# Patient Record
Sex: FEMALE | Race: WHITE | Employment: FULL TIME | URBAN - METROPOLITAN AREA
[De-identification: names, ages, dates, MRNs, and addresses within clinical notes are randomized per-mention and may not be internally consistent; named-entity substitution may affect disease eponyms.]

---

## 2018-01-08 ENCOUNTER — NAIL CHANGES (OUTPATIENT)
Dept: URBAN - METROPOLITAN AREA CLINIC 32 | Facility: CLINIC | Age: 38
Setting detail: DERMATOLOGY
End: 2018-01-08

## 2018-01-08 PROBLEM — L60.9 NAIL DISORDER, UNSPECIFIED: Status: RESOLVED | Noted: 2018-01-08

## 2018-01-08 PROBLEM — D22.5 MELANOCYTIC NEVI OF TRUNK: Status: RESOLVED | Noted: 2018-01-08

## 2018-01-08 PROCEDURE — 99202 OFFICE O/P NEW SF 15 MIN: CPT

## 2018-01-08 RX ORDER — FLUOCINONIDE 0.5 MG/ML
1 APPLICATION SOLUTION TOPICAL ADD'L SIG
Qty: 20 | Refills: 1
Start: 2018-01-08

## 2018-01-08 RX ORDER — KETOCONAZOLE 20 MG/ML
1 APPLICATION SHAMPOO, SUSPENSION TOPICAL 2 TIMES WEEKLY
Qty: 120 | Refills: 1
Start: 2018-01-08

## 2019-07-29 PROBLEM — 196735001 CSG - CHRONIC SUPERFICIAL GASTRITIS: Status: ACTIVE | Noted: 2019-07-29

## 2019-09-25 PROBLEM — 235856003 LIVER DISEASE: Status: ACTIVE | Noted: 2019-09-24

## 2020-01-25 PROBLEM — 398050005 DIVERTICULAR DISEASE OF COLON: Status: ACTIVE | Noted: 2019-11-18

## 2020-01-25 PROBLEM — 721703004 FIRST DEGREE HEMORRHOIDS: Status: ACTIVE | Noted: 2019-11-19

## 2020-07-20 ENCOUNTER — WEB ENCOUNTER (OUTPATIENT)
Dept: URBAN - METROPOLITAN AREA CLINIC 35 | Facility: CLINIC | Age: 40
End: 2020-07-20

## 2020-07-21 ENCOUNTER — OFFICE VISIT (OUTPATIENT)
Dept: URBAN - METROPOLITAN AREA CLINIC 35 | Facility: CLINIC | Age: 40
End: 2020-07-21

## 2020-07-21 ENCOUNTER — OFFICE VISIT (OUTPATIENT)
Dept: URBAN - METROPOLITAN AREA CLINIC 31 | Facility: CLINIC | Age: 40
End: 2020-07-21

## 2020-07-21 VITALS — BODY MASS INDEX: 24.11 KG/M2 | HEIGHT: 66 IN | WEIGHT: 150 LBS

## 2020-07-21 PROBLEM — 301717006 RIGHT UPPER QUADRANT PAIN: Status: ACTIVE | Noted: 2019-09-25

## 2020-07-21 PROBLEM — 271832001 FLATULENCE, ERUCTATION AND GAS PAIN: Status: ACTIVE | Noted: 2019-09-25

## 2020-07-21 PROBLEM — 80515008: Status: ACTIVE | Noted: 2019-10-01

## 2020-07-21 PROBLEM — 249504006 FLATULENCE: Status: ACTIVE | Noted: 2019-09-25

## 2020-07-21 PROBLEM — 441988000 IMAGING OF ABDOMEN ABNORMAL: Status: ACTIVE | Noted: 2019-11-19

## 2020-07-21 PROBLEM — 79922009 EPIGASTRIC PAIN: Status: ACTIVE | Noted: 2019-09-25

## 2020-07-21 RX ORDER — FERROUS SULFATE 325(65) MG
1 TABLET TABLET ORAL ONCE A DAY
Status: ACTIVE | COMMUNITY

## 2020-07-21 RX ORDER — BUSPIRONE HYDROCHLORIDE 7.5 MG/1
TAKE 1 TABLET BY MOUTH THREE TIMES A DAY AS NEEDED TABLET ORAL
Qty: 90 UNSPECIFIED | Refills: 0 | Status: ACTIVE | COMMUNITY

## 2020-07-21 NOTE — HPI-MIGRATED HPI
;   ;   ;   ;   ;   ;     Bloating/Gas : Continue to admit bloating and gas throughout the day.  Gas-X help with bloating.   Consuming potatoes and raw vegetables has been an aggravating factor, which she has decreased intake.   Last visit (1/21/2020) Patient denies less frequent episodes of bloating and gas since her last office visit. She reports that she will experience bloating daily. She describes symptoms as pressure. She admits that the abdominal pressure decreases after a bowel movement but the bloating remains.   She reports the bloating increases after eating or drinking. She denies that it depends on what she eats or drinks.    Last visit (9/24/2019) Admit bloating and gas with onset in Feb. 2019.  Described as generalized abdomina distention and increased flatulence.  She has tried Gas-X with relief.  H. Pylori (6/212019) with PCP- Negative (reported by patient)  ;   Abnormal MRI :    Last visit (1/21/2020) Patient denies any other MRIs at this time.    Last visit (9/24/2019) Patient presents today at the request of Dr. Page for a consultation of abnormal MRI Abdomen w/wo contrast performed on (8/24/2019) at [facility] revealing--There is elongation of the right hepatic lobe which is likely on the basis of a Riedel's configuration, a normal variation.  No suspicious hepatic mass.  No significant helatic steatosis.  No splenomegaly.  No focal splenic lesion.  No evidence for acute cholecystitis or acute pancreatitis.  Main portal vein at the upper limits of normal in diameter, nonspecific.    Also had abnormal CT Abdomen/Pelvis w/Contrast performed on (05/31/2019) and revealed Mild hepatomegaly measuring 20 cm without other morphologic changes to suggest liver disease or hepatic masses. 2. The spleen is not enlarged however there is moderate severity splenic varices and prominence to the portal vein. Further clinical follow up is recommended.  3. Dominant benign - appearing cyst is seen within the left adnexa measuring 3.9 cm. Dedicated endovaginal pelvic ultrasound would be of benefit for further characterization. 4. Small volume pelvic fluid .    She report having Hepatic Function Panel, Hepatitis Panel, TSH, Vitamin B, and Autoimmune panel performed by PCP on (9/20/2019) and awaiting results.   She has been referred to vascular surgeon (Dr. Tony Torres) following GI work up. ;   Dysphagia : Admit dysphagia with onset in Jan. 2020 at which time she saw her dentist and was referred to oral surgeon for sensation of right side of her tongue felt swelling and not able to move her tongue as she used to and was recommended Speech therapy.   She has completed 30 sessions of physical therapy with [facility] at [facility] in [place].  Patient report initially had 100 % improvement, now at 75% due to continuous exercises she has to do daily, but has not been consistent.;   Heartburn : Admit control of symptoms with dietary modifications, avoiding tomatoes products.   Last visit (1/21/2020)  Admit control of symptoms with dietary modifications, not eating late night and sleeping head elevated. She admits that these adjustments have helped her symptoms.   She denies taking any medications she reports that they gave her headaches.    Of last visit (9/24/2019) Admit heartburn symptoms with onset Feb. 2019.  Described as burning sensation retrosternally.  She report symptoms only occur when consuming acidic fruit/food and tomato based food or having and empty stomach.  She has been sleeping head elevated with some improvement. Also admit improvement since her anemia has been more under control.    ;   Abdominal Pain : Patient presents today via televisit with consent for a follow up of pain located in the epigastrium and RUQ.  Admit less frequent episodes since last visit, maybe 3 times a month,    Last visit (1/21/2020)  Continue to experience abdominal pain located in the epigastrium and RUQ intermittently throughout the day. Abdominal pressure , intermittent , worse with bread and butter, stress    Last visit (11/19/2019) Patient admits/denies any abdominal pain since her last office visit. Admits a swelling at the IV site which was placed for her colonoscopy. She reports the swelling has improved with warm compresses.   Currently admit 1 normal and formed bowel movement per day.  Denies melena, blood or mucus in stools.    Last visit (9/24/2019) Patient presents today for a follow up of a colonoscopy that was completed on 11/1/2019. Colonoscopy revealed Diverticulosis and first degree hemorrhoids.   Last visit (9/24/2019)  Continue to experiencing abdominal pain located in the epigastrium and RUQ.  Onset (2/2019). Described as a constant pain and pressure in the epigastrium.   RUQ pain described as a "pulling" sensation that will last for minutes at a time intermittently throughout the day.    Admit aggravating factor with bad posture while sitting. Laying down has been a relieving factor.   She has not taken medication for symptoms.    She admit treatment from PCP in May 2019 with Pepcid with some relief in the epigastrium, but d/c due to severe headaches.    Last visit (7/29/2019) Patient continue to experience some abdominal discomfort from her last office visit. Patient did not start taking Omeprazole 20 mg daily. She did not know that she could get the medication OTC so she did not start it.    On last visit 06/21/2019, Patient admits symptoms of abdominal pain that started in January/February 2019. The pain is located epigastric and periumbilical region. She describes the pain as a constant discomfort and tenderness. Eating will trigger the discomfort. Laying down will help to alleviate discomfort.    Associated symptoms include early satiety. Patient states that she will feel fast when she eats. She denies any nausea/vomiting but she will experience some dry heaving.   Patient was taking Pepcid which did slightly help with relief of indigestion but she stopped taking it because she was advised not to take Pepcid with iron. She states that she started taking iron 05/29/2019 and has noticed slightly improvements with her symptoms since starting.  Patient had a CT abd/pelvis done with PCP (results below).  Patient states that since she started taking the iron supplements her bowel movements are more regular, she will have 2 BM per day. She has never noticed any blood, mucus, melena in stools. ;   Anemia : Most recent labs with [facility] June 12, 2020 CBC: Hgb (WNL) 13.3, Hct (WNL) 43.1, MCV (WNL) 92, MCHC (Low) 30.9, Platelets (WNL) 284.    She  continue to take Ferrous sulfate 65 fe QD.     Last visit (1/21/2020) She continue to take Iron supplement 1 QD.  Admits a recent CBC or Iron/Ferritin lab work 12/31/2019.    Of last visit (9/24/2019) Most recent labs (9/20/2019) with PCP: Hepatic Function Panel, Iron,  Hepatitis Panel, TSH, Vitamin B, Autoimmune panel performed by PCP on (9/20/2019) and awaiting results.     Last visit (7/29/2019) Patient continues to take iron supplements since May 4 2019  On last visit 06/21/2019, Patient was recently diagnosed by CESILIA Tabor at [facility], and the most recent labs were taken 06/05/2019. Patient currently states that she started taking an iron supplements 05/29/2019 and that she has not had a transfusion. Patient did experience fatigue and light headedness, but after taking the iron she has noticed improvements with the fatigue. Patient admits NSAID use, a history of GI bleeding, any blood or melena in stools, fatigue, cold extremities, dizziness.  Patient states she has not had an EGD before.    CT ABD/PELVIS 1. Mild hepatomegaly measuring 20 cm without other morphologic changes to suggest liver disease o hepatic masses. 2. The spleen is not enlarged however there is moderate severity splenic varices and prominence to the portal vein. Further clinical follow-is recommended. 3. Dominant benign-appeared cyst is seen within the elft adnexa measuring 3.9 cm. Dedicated endovaginal pelvis US would be of benefit for further characterization. 4. Small volume pelvic fluid.   OUTSIDE LABS 06/05/2019 - Dr. Tabor Fecal globin by immunochemistry - negative CMP normal CBC normal except Hemoglobin 9.3 L Hematocrit 32.3 L MCV 76.0 L MCH 21.9 L MCHC 28.8 L RDW 16.8 H Ferritin 5 L Iron, total 16 L % Sat 4 L OUTSIDE LABS 05/28/2019 CMP normal CBC normal except Hemoglobin 9.2 L Hematocrit 32.4 L MCV 77.9 L MCH 22.1 L MCHC 28.4 L RDW 17.0 H Helicobacter pylori - negative;

## 2020-07-26 ENCOUNTER — TELEPHONE ENCOUNTER (OUTPATIENT)
Dept: URBAN - METROPOLITAN AREA CLINIC 35 | Facility: CLINIC | Age: 40
End: 2020-07-26

## 2020-07-30 ENCOUNTER — TELEPHONE ENCOUNTER (OUTPATIENT)
Dept: URBAN - METROPOLITAN AREA CLINIC 35 | Facility: CLINIC | Age: 40
End: 2020-07-30

## 2020-08-07 ENCOUNTER — WEB ENCOUNTER (OUTPATIENT)
Dept: URBAN - METROPOLITAN AREA CLINIC 35 | Facility: CLINIC | Age: 40
End: 2020-08-07

## 2020-08-10 ENCOUNTER — TELEPHONE ENCOUNTER (OUTPATIENT)
Dept: URBAN - METROPOLITAN AREA CLINIC 35 | Facility: CLINIC | Age: 40
End: 2020-08-10

## 2020-08-10 ENCOUNTER — WEB ENCOUNTER (OUTPATIENT)
Dept: URBAN - METROPOLITAN AREA CLINIC 35 | Facility: CLINIC | Age: 40
End: 2020-08-10

## 2020-08-12 ENCOUNTER — TELEPHONE ENCOUNTER (OUTPATIENT)
Dept: URBAN - METROPOLITAN AREA CLINIC 35 | Facility: CLINIC | Age: 40
End: 2020-08-12

## 2020-08-20 ENCOUNTER — OFFICE VISIT (OUTPATIENT)
Dept: URBAN - METROPOLITAN AREA CLINIC 33 | Facility: CLINIC | Age: 40
End: 2020-08-20

## 2020-08-20 VITALS
SYSTOLIC BLOOD PRESSURE: 118 MMHG | TEMPERATURE: 97.4 F | HEART RATE: 78 BPM | HEIGHT: 66 IN | WEIGHT: 156 LBS | DIASTOLIC BLOOD PRESSURE: 72 MMHG | BODY MASS INDEX: 25.07 KG/M2

## 2020-08-20 RX ORDER — BUSPIRONE HYDROCHLORIDE 7.5 MG/1
TAKE 1 TABLET BY MOUTH THREE TIMES A DAY AS NEEDED TABLET ORAL
Qty: 90 UNSPECIFIED | Refills: 0 | Status: ACTIVE | COMMUNITY

## 2020-08-20 RX ORDER — FERROUS SULFATE 325(65) MG
1 TABLET TABLET ORAL ONCE A DAY
Status: ACTIVE | COMMUNITY

## 2020-08-20 NOTE — HPI-MIGRATED HPI
;   ;   ;   ;   ;   ;     Bloating/Gas : Admit generalized abdominal bloating and increase gas associated with upper abdominal discomfort.  Symptoms occur when she consume fried greasy food.  Occurring once a week since last visit.   She will take Gas-X with severe bloating and gas episodes with relief.     Last visit (7/21/2020)Continue to admit bloating and gas throughout the day. Gas-X help with bloating. Consuming potatoes and raw vegetables has been an aggravating factor, which she has decreased intake. Last visit (1/21/2020) Patient denies less frequent episodes of bloating and gas since her last office visit. She reports that she will experience bloating daily. She describes symptoms as pressure. She admits that the abdominal pressure decreases after a bowel movement but the bloating remains. She reports the bloating increases after eating or drinking. She denies that it depends on what she eats or drinks. Last visit (9/24/2019) Admit bloating and gas with onset in Feb. 2019. Described as generalized abdomina distention and increased flatulence. She has tried Gas-X with relief. H. Pylori (6/212019) with PCP- Negative (reported by patient) ;   Abnormal MRI : Patient did not have MRI due to insurance denial (working on resubmitting).    Last visit (7/21/2020) Patient denies any other MRIs at this time.    Last visit (9/24/2019) Patient presents today at the request of Dr. Page for a consultation of abnormal MRI Abdomen w/wo contrast performed on (8/24/2019) at [facility] revealing--There is elongation of the right hepatic lobe which is likely on the basis of a Riedel's configuration, a normal variation.  No suspicious hepatic mass.  No significant helatic steatosis.  No splenomegaly.  No focal splenic lesion.  No evidence for acute cholecystitis or acute pancreatitis.  Main portal vein at the upper limits of normal in diameter, nonspecific.    Also had abnormal CT Abdomen/Pelvis w/Contrast performed on (05/31/2019) and revealed Mild hepatomegaly measuring 20 cm without other morphologic changes to suggest liver disease or hepatic masses. 2. The spleen is not enlarged however there is moderate severity splenic varices and prominence to the portal vein. Further clinical follow up is recommended.  3. Dominant benign - appearing cyst is seen within the left adnexa measuring 3.9 cm. Dedicated endovaginal pelvic ultrasound would be of benefit for further characterization. 4. Small volume pelvic fluid .    She report having Hepatic Function Panel, Hepatitis Panel, TSH, Vitamin B, and Autoimmune panel performed by PCP on (9/20/2019) and awaiting results.   She has been referred to vascular surgeon (Dr. Collin Torres) following GI work up. ;   Dysphagia : Denies dysphagia.   Last visit (7/21/2020) Admit dysphagia with onset in Jan. 2020 at which time she saw her dentist and was referred to oral surgeon for sensation of right side of her tongue felt swelling and not able to move her tongue as she used to and was recommended Speech therapy.   She has completed 30 sessions of physical therapy with [facility] at [facility] in [place].  Patient report initially had 100 % improvement, now at 75% due to continuous exercises she has to do daily, but has not been consistent.;   Heartburn : Admit control of symptoms with dietary modifications, avoiding tomatoes products.   Last visit (7/21/2020)  Admit control of symptoms with dietary modifications, not eating late night and sleeping head elevated. She admits that these adjustments have helped her symptoms.   She denies taking any medications she reports that they gave her headaches.    Of last visit (9/24/2019) Admit heartburn symptoms with onset Feb. 2019.  Described as burning sensation retrosternally.  She report symptoms only occur when consuming acidic fruit/food and tomato based food or having and empty stomach.  She has been sleeping head elevated with some improvement. Also admit improvement since her anemia has been more under control.    ;   Abdominal Pain : Patient presents today for a follow up of pain located in the epigastrium and RUQ.  Described as a discomfort that will last throughout the day with associated bloating and increase gas.  Symptoms occur when she consume fried greasy food.  Occurring once a week since last visit.   She will take Gas-X with severe bloating and gas episodes with relief. Also stretching will help the abdominal discomfort.    Last visit (7/21/2020)  Continue to experience abdominal pain located in the epigastrium and RUQ intermittently throughout the day. Abdominal pressure , intermittent , worse with bread and butter, stress    Last visit (11/19/2019) Patient admits/denies any abdominal pain since her last office visit. Admits a swelling at the IV site which was placed for her colonoscopy. She reports the swelling has improved with warm compresses.   Currently admit 1 normal and formed bowel movement per day.  Denies melena, blood or mucus in stools.    Last visit (9/24/2019) Patient presents today for a follow up of a colonoscopy that was completed on 11/1/2019. Colonoscopy revealed Diverticulosis and first degree hemorrhoids.   Last visit (9/24/2019)  Continue to experiencing abdominal pain located in the epigastrium and RUQ.  Onset (2/2019). Described as a constant pain and pressure in the epigastrium.   RUQ pain described as a "pulling" sensation that will last for minutes at a time intermittently throughout the day.    Admit aggravating factor with bad posture while sitting. Laying down has been a relieving factor.   She has not taken medication for symptoms.    She admit treatment from PCP in May 2019 with Pepcid with some relief in the epigastrium, but d/c due to severe headaches.    Last visit (7/29/2019) Patient continue to experience some abdominal discomfort from her last office visit. Patient did not start taking Omeprazole 20 mg daily. She did not know that she could get the medication OTC so she did not start it.    On last visit 06/21/2019, Patient admits symptoms of abdominal pain that started in January/February 2019. The pain is located epigastric and periumbilical region. She describes the pain as a constant discomfort and tenderness. Eating will trigger the discomfort. Laying down will help to alleviate discomfort.    Associated symptoms include early satiety. Patient states that she will feel fast when she eats. She denies any nausea/vomiting but she will experience some dry heaving.   Patient was taking Pepcid which did slightly help with relief of indigestion but she stopped taking it because she was advised not to take Pepcid with iron. She states that she started taking iron 05/29/2019 and has noticed slightly improvements with her symptoms since starting.  Patient had a CT abd/pelvis done with PCP (results below).  Patient states that since she started taking the iron supplements her bowel movements are more regular, she will have 2 BM per day. She has never noticed any blood, mucus, melena in stools. ;   Anemia : Most recent labs with [facility]BG[facility] June 12, 2020 CBC: Hgb (WNL) 13.3, Hct (WNL) 43.1, MCV (WNL) 92, MCHC (Low) 30.9, Platelets (WNL) 284.    She  continue to take Ferrous sulfate 65 fe QD.     Last visit (7/21/2020) She continue to take Iron supplement 1 QD.  Admits a recent CBC or Iron/Ferritin lab work 12/31/2019.    Of last visit (9/24/2019) Most recent labs (9/20/2019) with PCP: Hepatic Function Panel, Iron,  Hepatitis Panel, TSH, Vitamin B, Autoimmune panel performed by PCP on (9/20/2019) and awaiting results.     Last visit (7/29/2019) Patient continues to take iron supplements since May 4 2019  On last visit 06/21/2019, Patient was recently diagnosed by CESILIA Tabor at [facility], and the most recent labs were taken 06/05/2019. Patient currently states that she started taking an iron supplements 05/29/2019 and that she has not had a transfusion. Patient did experience fatigue and light headedness, but after taking the iron she has noticed improvements with the fatigue. Patient admits NSAID use, a history of GI bleeding, any blood or melena in stools, fatigue, cold extremities, dizziness.  Patient states she has not had an EGD before.    CT ABD/PELVIS 1. Mild hepatomegaly measuring 20 cm without other morphologic changes to suggest liver disease o hepatic masses. 2. The spleen is not enlarged however there is moderate severity splenic varices and prominence to the portal vein. Further clinical follow-is recommended. 3. Dominant benign-appeared cyst is seen within the elft adnexa measuring 3.9 cm. Dedicated endovaginal pelvis US would be of benefit for further characterization. 4. Small volume pelvic fluid.   OUTSIDE LABS 06/05/2019 - Dr. Tabor Fecal globin by immunochemistry - negative CMP normal CBC normal except Hemoglobin 9.3 L Hematocrit 32.3 L MCV 76.0 L MCH 21.9 L MCHC 28.8 L RDW 16.8 H Ferritin 5 L Iron, total 16 L % Sat 4 L OUTSIDE LABS 05/28/2019 CMP normal CBC normal except Hemoglobin 9.2 L Hematocrit 32.4 L MCV 77.9 L MCH 22.1 L MCHC 28.4 L RDW 17.0 H Helicobacter pylori - negative;

## 2020-08-21 LAB
% SATURATION: 10
ABSOLUTE BASOPHILS: 58
ABSOLUTE EOSINOPHILS: 141
ABSOLUTE LYMPHOCYTES: 1702
ABSOLUTE MONOCYTES: 346
ABSOLUTE NEUTROPHILS: 4154
BASOPHILS: 0.9
EOSINOPHILS: 2.2
FERRITIN: 6
HEMATOCRIT: 38
HEMOGLOBIN: 12.1
IRON BINDING CAPACITY: 374
IRON, TOTAL: 39
LYMPHOCYTES: 26.6
MCH: 27.8
MCHC: 31.8
MCV: 87.4
MONOCYTES: 5.4
MPV: 12.2
NEUTROPHILS: 64.9
PLATELET COUNT: 313
RDW: 14.1
RED BLOOD CELL COUNT: 4.35
WHITE BLOOD CELL COUNT: 6.4

## 2020-09-24 ENCOUNTER — OFFICE VISIT (OUTPATIENT)
Dept: URBAN - METROPOLITAN AREA CLINIC 33 | Facility: CLINIC | Age: 40
End: 2020-09-24

## 2020-09-24 NOTE — HPI-MIGRATED HPI
;   ;   ;   ;   ;   ;     Bloating/Gas : She admits/denies any episodes of bloating/gas since her last visit.   Last visit (8/20/2020) Admit generalized abdominal bloating and increase gas associated with upper abdominal discomfort.  Symptoms occur when she consume fried greasy food.  Occurring once a week since last visit.   She will take Gas-X with severe bloating and gas episodes with relief.     Last visit (7/21/2020)Continue to admit bloating and gas throughout the day. Gas-X help with bloating. Consuming potatoes and raw vegetables has been an aggravating factor, which she has decreased intake. Last visit (1/21/2020) Patient denies less frequent episodes of bloating and gas since her last office visit. She reports that she will experience bloating daily. She describes symptoms as pressure. She admits that the abdominal pressure decreases after a bowel movement but the bloating remains. She reports the bloating increases after eating or drinking. She denies that it depends on what she eats or drinks. Last visit (9/24/2019) Admit bloating and gas with onset in Feb. 2019. Described as generalized abdomina distention and increased flatulence. She has tried Gas-X with relief. H. Pylori (6/[id number]) with PCP- Negative (reported by patient) ;   Abnormal MRI :    Last visit (8/20/2020) Patient did not have MRI due to insurance denial (working on resubmitting).    Last visit (7/21/2020) Patient denies any other MRIs at this time.    Last visit (9/24/2019) Patient presents today at the request of Dr. Page for a consultation of abnormal MRI Abdomen w/wo contrast performed on (8/24/2019) at [facility] revealing--There is elongation of the right hepatic lobe which is likely on the basis of a Riedel's configuration, a normal variation.  No suspicious hepatic mass.  No significant helatic steatosis.  No splenomegaly.  No focal splenic lesion.  No evidence for acute cholecystitis or acute pancreatitis.  Main portal vein at the upper limits of normal in diameter, nonspecific.    Also had abnormal CT Abdomen/Pelvis w/Contrast performed on (05/31/2019) and revealed Mild hepatomegaly measuring 20 cm without other morphologic changes to suggest liver disease or hepatic masses. 2. The spleen is not enlarged however there is moderate severity splenic varices and prominence to the portal vein. Further clinical follow up is recommended.  3. Dominant benign - appearing cyst is seen within the left adnexa measuring 3.9 cm. Dedicated endovaginal pelvic ultrasound would be of benefit for further characterization. 4. Small volume pelvic fluid .    She report having Hepatic Function Panel, Hepatitis Panel, TSH, Vitamin B, and Autoimmune panel performed by PCP on (9/20/2019) and awaiting results.   She has been referred to vascular surgeon (Dr. Collin Torres) following GI work up. ;   Dysphagia : Patient continues to deny episodes of dysphagia.   Last visit (8/20/2020) Denies dysphagia.   Last visit (7/21/2020) Admit dysphagia with onset in Jan. 2020 at which time she saw her dentist and was referred to oral surgeon for sensation of right side of her tongue felt swelling and not able to move her tongue as she used to and was recommended Speech therapy.   She has completed 30 sessions of physical therapy with [facility] at [facility] in [place].  Patient report initially had 100 % improvement, now at 75% due to continuous exercises she has to do daily, but has not been consistent.;   Heartburn : Patient admits/denies symptoms are controlled with the use of ----.    Last visit (8/20/2020) Admit control of symptoms with dietary modifications, avoiding tomatoes products.   Last visit (7/21/2020)  Admit control of symptoms with dietary modifications, not eating late night and sleeping head elevated. She admits that these adjustments have helped her symptoms.   She denies taking any medications she reports that they gave her headaches.    Of last visit (9/24/2019) Admit heartburn symptoms with onset Feb. 2019.  Described as burning sensation retrosternally.  She report symptoms only occur when consuming acidic fruit/food and tomato based food or having and empty stomach.  She has been sleeping head elevated with some improvement. Also admit improvement since her anemia has been more under control.    ;   Abdominal Pain : Patient presents today for a follow up of abdominal pain. She admits/denies continued abdominal pain since her last visit.    Last visit (8/20/2020) Patient presents today for a follow up of pain located in the epigastrium and RUQ.  Described as a discomfort that will last throughout the day with associated bloating and increase gas.  Symptoms occur when she consume fried greasy food.  Occurring once a week since last visit.   She will take Gas-X with severe bloating and gas episodes with relief. Also stretching will help the abdominal discomfort.    Last visit (7/21/2020)  Continue to experience abdominal pain located in the epigastrium and RUQ intermittently throughout the day. Abdominal pressure , intermittent , worse with bread and butter, stress    Last visit (11/19/2019) Patient admits/denies any abdominal pain since her last office visit. Admits a swelling at the IV site which was placed for her colonoscopy. She reports the swelling has improved with warm compresses.   Currently admit 1 normal and formed bowel movement per day.  Denies melena, blood or mucus in stools.    Last visit (9/24/2019) Patient presents today for a follow up of a colonoscopy that was completed on 11/1/2019. Colonoscopy revealed Diverticulosis and first degree hemorrhoids.   Last visit (9/24/2019)  Continue to experiencing abdominal pain located in the epigastrium and RUQ.  Onset (2/2019). Described as a constant pain and pressure in the epigastrium.   RUQ pain described as a "pulling" sensation that will last for minutes at a time intermittently throughout the day.    Admit aggravating factor with bad posture while sitting. Laying down has been a relieving factor.   She has not taken medication for symptoms.    She admit treatment from PCP in May 2019 with Pepcid with some relief in the epigastrium, but d/c due to severe headaches.    Last visit (7/29/2019) Patient continue to experience some abdominal discomfort from her last office visit. Patient did not start taking Omeprazole 20 mg daily. She did not know that she could get the medication OTC so she did not start it.    On last visit 06/21/2019, Patient admits symptoms of abdominal pain that started in January/February 2019. The pain is located epigastric and periumbilical region. She describes the pain as a constant discomfort and tenderness. Eating will trigger the discomfort. Laying down will help to alleviate discomfort.    Associated symptoms include early satiety. Patient states that she will feel fast when she eats. She denies any nausea/vomiting but she will experience some dry heaving.   Patient was taking Pepcid which did slightly help with relief of indigestion but she stopped taking it because she was advised not to take Pepcid with iron. She states that she started taking iron 05/29/2019 and has noticed slightly improvements with her symptoms since starting.  Patient had a CT abd/pelvis done with PCP (results below).  Patient states that since she started taking the iron supplements her bowel movements are more regular, she will have 2 BM per day. She has never noticed any blood, mucus, melena in stools. ;   Anemia : Most recent labs were completed 8/20/2020 with results listed below.    Last visit (8/20/2020) Most recent labs with OBGYN June 12, 2020 CBC: Hgb (WNL) 13.3, Hct (WNL) 43.1, MCV (WNL) 92, MCHC (Low) 30.9, Platelets (WNL) 284.    She  continue to take Ferrous sulfate 65 fe QD.     Last visit (7/21/2020) She continue to take Iron supplement 1 QD.  Admits a recent CBC or Iron/Ferritin lab work 12/31/2019.    Of last visit (9/24/2019) Most recent labs (9/20/2019) with PCP: Hepatic Function Panel, Iron,  Hepatitis Panel, TSH, Vitamin B, Autoimmune panel performed by PCP on (9/20/2019) and awaiting results.     Last visit (7/29/2019) Patient continues to take iron supplements since May 4 2019  On last visit 06/21/2019, Patient was recently diagnosed by CESILIA Tabor at [facility], and the most recent labs were taken 06/05/2019. Patient currently states that she started taking an iron supplements 05/29/2019 and that she has not had a transfusion. Patient did experience fatigue and light headedness, but after taking the iron she has noticed improvements with the fatigue. Patient admits NSAID use, a history of GI bleeding, any blood or melena in stools, fatigue, cold extremities, dizziness.  Patient states she has not had an EGD before.    CT ABD/PELVIS 1. Mild hepatomegaly measuring 20 cm without other morphologic changes to suggest liver disease o hepatic masses. 2. The spleen is not enlarged however there is moderate severity splenic varices and prominence to the portal vein. Further clinical follow-is recommended. 3. Dominant benign-appeared cyst is seen within the elft adnexa measuring 3.9 cm. Dedicated endovaginal pelvis US would be of benefit for further characterization. 4. Small volume pelvic fluid.   OUTSIDE LABS 06/05/2019 - Dr. Tabor Fecal globin by immunochemistry - negative CMP normal CBC normal except Hemoglobin 9.3 L Hematocrit 32.3 L MCV 76.0 L MCH 21.9 L MCHC 28.8 L RDW 16.8 H Ferritin 5 L Iron, total 16 L % Sat 4 L OUTSIDE LABS 05/28/2019 CMP normal CBC normal except Hemoglobin 9.2 L Hematocrit 32.4 L MCV 77.9 L MCH 22.1 L MCHC 28.4 L RDW 17.0 H Helicobacter pylori - negative;

## 2020-09-25 ENCOUNTER — WEB ENCOUNTER (OUTPATIENT)
Dept: URBAN - METROPOLITAN AREA CLINIC 35 | Facility: CLINIC | Age: 40
End: 2020-09-25

## 2020-09-30 ENCOUNTER — WEB ENCOUNTER (OUTPATIENT)
Dept: URBAN - METROPOLITAN AREA CLINIC 35 | Facility: CLINIC | Age: 40
End: 2020-09-30

## 2020-10-06 ENCOUNTER — WEB ENCOUNTER (OUTPATIENT)
Dept: URBAN - METROPOLITAN AREA CLINIC 35 | Facility: CLINIC | Age: 40
End: 2020-10-06

## 2020-10-15 ENCOUNTER — TELEPHONE ENCOUNTER (OUTPATIENT)
Dept: URBAN - METROPOLITAN AREA CLINIC 35 | Facility: CLINIC | Age: 40
End: 2020-10-15

## 2020-10-27 ENCOUNTER — OFFICE VISIT (OUTPATIENT)
Dept: URBAN - METROPOLITAN AREA CLINIC 35 | Facility: CLINIC | Age: 40
End: 2020-10-27

## 2020-10-27 VITALS
DIASTOLIC BLOOD PRESSURE: 74 MMHG | BODY MASS INDEX: 24.91 KG/M2 | HEART RATE: 76 BPM | WEIGHT: 155 LBS | RESPIRATION RATE: 18 BRPM | SYSTOLIC BLOOD PRESSURE: 118 MMHG | HEIGHT: 66 IN

## 2020-10-27 RX ORDER — BUSPIRONE HYDROCHLORIDE 7.5 MG/1
TAKE 1 TABLET BY MOUTH THREE TIMES A DAY AS NEEDED TABLET ORAL
Qty: 90 UNSPECIFIED | Refills: 0 | Status: ACTIVE | COMMUNITY

## 2020-10-27 RX ORDER — FERROUS SULFATE 325(65) MG
1 TABLET TABLET ORAL ONCE A DAY
Status: ACTIVE | COMMUNITY

## 2020-10-27 NOTE — HPI-MIGRATED HPI
;   ;   ;   ;   ;   ;     Bloating/Gas : She continue to admit bloating/gas that is associated with episodes of RUQ abdominal pain.    Admit generalized abdominal bloating and increase belching.  Of note, patient could not take Famotidine and Pilose due to experiencing headaches.  Tums help with burping episodes.  Last visit (8/20/2020) Admit generalized abdominal bloating and increase gas associated with upper abdominal discomfort.  Symptoms occur when she consume fried greasy food.  Occurring once a week since last visit.   She will take Gas-X with severe bloating and gas episodes with relief.     Last visit (7/21/2020)Continue to admit bloating and gas throughout the day. Gas-X help with bloating. Consuming potatoes and raw vegetables has been an aggravating factor, which she has decreased intake. Last visit (1/21/2020) Patient denies less frequent episodes of bloating and gas since her last office visit. She reports that she will experience bloating daily. She describes symptoms as pressure. She admits that the abdominal pressure decreases after a bowel movement but the bloating remains. She reports the bloating increases after eating or drinking. She denies that it depends on what she eats or drinks. Last visit (9/24/2019) Admit bloating and gas with onset in Feb. 2019. Described as generalized abdomina distention and increased flatulence. She has tried Gas-X with relief. H. Pylori (6/212019) with PCP- Negative (reported by patient) ;   Abnormal MRI : Patient presents today to review MRI completed on 10/3/2020 and lab results.      Last visit (8/20/2020) Patient did not have MRI due to insurance denial (working on resubmitting).    Last visit (7/21/2020) Patient denies any other MRIs at this time.    Last visit (9/24/2019) Patient presents today at the request of Dr. Page for a consultation of abnormal MRI Abdomen w/wo contrast performed on (8/24/2019) at [facility] revealing--There is elongation of the right hepatic lobe which is likely on the basis of a Riedel's configuration, a normal variation.  No suspicious hepatic mass.  No significant helatic steatosis.  No splenomegaly.  No focal splenic lesion.  No evidence for acute cholecystitis or acute pancreatitis.  Main portal vein at the upper limits of normal in diameter, nonspecific.    Also had abnormal CT Abdomen/Pelvis w/Contrast performed on (05/31/2019) and revealed Mild hepatomegaly measuring 20 cm without other morphologic changes to suggest liver disease or hepatic masses. 2. The spleen is not enlarged however there is moderate severity splenic varices and prominence to the portal vein. Further clinical follow up is recommended.  3. Dominant benign - appearing cyst is seen within the left adnexa measuring 3.9 cm. Dedicated endovaginal pelvic ultrasound would be of benefit for further characterization. 4. Small volume pelvic fluid .    She report having Hepatic Function Panel, Hepatitis Panel, TSH, Vitamin B, and Autoimmune panel performed by PCP on (9/20/2019) and awaiting results.   She has been referred to vascular surgeon (Dr. Collin Torres) following GI work up. ;   Dysphagia : Patient continues to deny episodes of dysphagia.   Last visit (8/20/2020) Denies dysphagia.   Last visit (7/21/2020) Admit dysphagia with onset in Jan. 2020 at which time she saw her dentist and was referred to oral surgeon for sensation of right side of her tongue felt swelling and not able to move her tongue as she used to and was recommended Speech therapy.   She has completed 30 sessions of physical therapy with [facility] at [facility] in [place].  Patient report initially had 100 % improvement, now at 75% due to continuous exercises she has to do daily, but has not been consistent.;   Heartburn : Admit control of symptoms with dietary modifications, avoiding tomatoes products.     Last visit (8/20/2020) Admit control of symptoms with dietary modifications, avoiding tomatoes products.   Last visit (7/21/2020)  Admit control of symptoms with dietary modifications, not eating late night and sleeping head elevated. She admits that these adjustments have helped her symptoms.   She denies taking any medications she reports that they gave her headaches.    Of last visit (9/24/2019) Admit heartburn symptoms with onset Feb. 2019.  Described as burning sensation retrosternally.  She report symptoms only occur when consuming acidic fruit/food and tomato based food or having and empty stomach.  She has been sleeping head elevated with some improvement. Also admit improvement since her anemia has been more under control.    ;   Abdominal Pain : Continue to experience RUQ abdominal pain that occur daily and will last throughout the day.  Describe as a discomfort. She will have a stabbing pain that occur once a week in an intermittent pattern.  Tums help the discomfort.  Consuming fatty/greasy food has been an aggravating factor.  Last visit (8/20/2020) Patient presents today for a follow up of pain located in the epigastrium and RUQ.  Described as a discomfort that will last throughout the day with associated bloating and increase gas.  Symptoms occur when she consume fried greasy food.  Occurring once a week since last visit.   She will take Gas-X with severe bloating and gas episodes with relief. Also stretching will help the abdominal discomfort.    Last visit (7/21/2020)  Continue to experience abdominal pain located in the epigastrium and RUQ intermittently throughout the day. Abdominal pressure , intermittent , worse with bread and butter, stress    Last visit (11/19/2019) Patient admits/denies any abdominal pain since her last office visit. Admits a swelling at the IV site which was placed for her colonoscopy. She reports the swelling has improved with warm compresses.   Currently admit 1 normal and formed bowel movement per day.  Denies melena, blood or mucus in stools.    Last visit (9/24/2019) Patient presents today for a follow up of a colonoscopy that was completed on 11/1/2019. Colonoscopy revealed Diverticulosis and first degree hemorrhoids.   Last visit (9/24/2019)  Continue to experiencing abdominal pain located in the epigastrium and RUQ.  Onset (2/2019). Described as a constant pain and pressure in the epigastrium.   RUQ pain described as a "pulling" sensation that will last for minutes at a time intermittently throughout the day.    Admit aggravating factor with bad posture while sitting. Laying down has been a relieving factor.   She has not taken medication for symptoms.    She admit treatment from PCP in May 2019 with Pepcid with some relief in the epigastrium, but d/c due to severe headaches.    Last visit (7/29/2019) Patient continue to experience some abdominal discomfort from her last office visit. Patient did not start taking Omeprazole 20 mg daily. She did not know that she could get the medication OTC so she did not start it.    On last visit 06/21/2019, Patient admits symptoms of abdominal pain that started in January/February 2019. The pain is located epigastric and periumbilical region. She describes the pain as a constant discomfort and tenderness. Eating will trigger the discomfort. Laying down will help to alleviate discomfort.    Associated symptoms include early satiety. Patient states that she will feel fast when she eats. She denies any nausea/vomiting but she will experience some dry heaving.   Patient was taking Pepcid which did slightly help with relief of indigestion but she stopped taking it because she was advised not to take Pepcid with iron. She states that she started taking iron 05/29/2019 and has noticed slightly improvements with her symptoms since starting.  Patient had a CT abd/pelvis done with PCP (results below).  Patient states that since she started taking the iron supplements her bowel movements are more regular, she will have 2 BM per day. She has never noticed any blood, mucus, melena in stools. ;   Anemia : Most recent labs were completed 8/20/2020 with results listed below.    Last visit (8/20/2020) Most recent labs with OBGYN June 12, 2020 CBC: Hgb (WNL) 13.3, Hct (WNL) 43.1, MCV (WNL) 92, MCHC (Low) 30.9, Platelets (WNL) 284.    She  continue to take Ferrous sulfate 65 fe QD.     Last visit (7/21/2020) She continue to take Iron supplement 1 QD.  Admits a recent CBC or Iron/Ferritin lab work 12/31/2019.    Of last visit (9/24/2019) Most recent labs (9/20/2019) with PCP: Hepatic Function Panel, Iron,  Hepatitis Panel, TSH, Vitamin B, Autoimmune panel performed by PCP on (9/20/2019) and awaiting results.     Last visit (7/29/2019) Patient continues to take iron supplements since May 4 2019  On last visit 06/21/2019, Patient was recently diagnosed by CESILIA Tabor at [facility], and the most recent labs were taken 06/05/2019. Patient currently states that she started taking an iron supplements 05/29/2019 and that she has not had a transfusion. Patient did experience fatigue and light headedness, but after taking the iron she has noticed improvements with the fatigue. Patient admits NSAID use, a history of GI bleeding, any blood or melena in stools, fatigue, cold extremities, dizziness.  Patient states she has not had an EGD before.    CT ABD/PELVIS 1. Mild hepatomegaly measuring 20 cm without other morphologic changes to suggest liver disease o hepatic masses. 2. The spleen is not enlarged however there is moderate severity splenic varices and prominence to the portal vein. Further clinical follow-is recommended. 3. Dominant benign-appeared cyst is seen within the elft adnexa measuring 3.9 cm. Dedicated endovaginal pelvis US would be of benefit for further characterization. 4. Small volume pelvic fluid.   OUTSIDE LABS 06/05/2019 - Dr. Tabor Fecal globin by immunochemistry - negative CMP normal CBC normal except Hemoglobin 9.3 L Hematocrit 32.3 L MCV 76.0 L MCH 21.9 L MCHC 28.8 L RDW 16.8 H Ferritin 5 L Iron, total 16 L % Sat 4 L OUTSIDE LABS 05/28/2019 CMP normal CBC normal except Hemoglobin 9.2 L Hematocrit 32.4 L MCV 77.9 L MCH 22.1 L MCHC 28.4 L RDW 17.0 H Helicobacter pylori - negative;

## 2020-11-05 ENCOUNTER — WEB ENCOUNTER (OUTPATIENT)
Dept: URBAN - METROPOLITAN AREA CLINIC 35 | Facility: CLINIC | Age: 40
End: 2020-11-05

## 2021-03-01 ENCOUNTER — LAB OUTSIDE AN ENCOUNTER (OUTPATIENT)
Dept: URBAN - METROPOLITAN AREA CLINIC 35 | Facility: CLINIC | Age: 41
End: 2021-03-01

## 2021-03-11 ENCOUNTER — TELEPHONE ENCOUNTER (OUTPATIENT)
Dept: URBAN - METROPOLITAN AREA CLINIC 35 | Facility: CLINIC | Age: 41
End: 2021-03-11

## 2021-03-31 ENCOUNTER — LAB OUTSIDE AN ENCOUNTER (OUTPATIENT)
Dept: URBAN - METROPOLITAN AREA CLINIC 35 | Facility: CLINIC | Age: 41
End: 2021-03-31

## 2021-04-01 LAB
% SATURATION: 10
ABSOLUTE BASOPHILS: 60
ABSOLUTE EOSINOPHILS: 302
ABSOLUTE LYMPHOCYTES: 1595
ABSOLUTE MONOCYTES: 395
ABSOLUTE NEUTROPHILS: 4348
BASOPHILS: 0.9
EOSINOPHILS: 4.5
FERRITIN: 5
HEMATOCRIT: 37.8
HEMOGLOBIN: 11.3
IRON BINDING CAPACITY: 368
IRON, TOTAL: 37
LYMPHOCYTES: 23.8
MCH: 25.2
MCHC: 29.9
MCV: 84.4
MONOCYTES: 5.9
MPV: 12.6
NEUTROPHILS: 64.9
PLATELET COUNT: 272
RDW: 16.1
RED BLOOD CELL COUNT: 4.48
WHITE BLOOD CELL COUNT: 6.7

## 2021-04-13 ENCOUNTER — TELEPHONE ENCOUNTER (OUTPATIENT)
Dept: URBAN - METROPOLITAN AREA CLINIC 35 | Facility: CLINIC | Age: 41
End: 2021-04-13

## 2021-04-13 ENCOUNTER — OFFICE VISIT (OUTPATIENT)
Dept: URBAN - METROPOLITAN AREA CLINIC 35 | Facility: CLINIC | Age: 41
End: 2021-04-13

## 2021-04-13 VITALS
WEIGHT: 158 LBS | DIASTOLIC BLOOD PRESSURE: 76 MMHG | OXYGEN SATURATION: 98 % | HEIGHT: 66 IN | BODY MASS INDEX: 25.39 KG/M2 | HEART RATE: 83 BPM | TEMPERATURE: 99.8 F | SYSTOLIC BLOOD PRESSURE: 122 MMHG

## 2021-04-13 RX ORDER — FERRIC CARBOXYMALTOSE INJECTION 50 MG/ML
AS DIRECTED INJECTION, SOLUTION INTRAVENOUS
Qty: 1 | Refills: 1 | OUTPATIENT
Start: 2021-04-14

## 2021-04-13 RX ORDER — FERROUS SULFATE 325(65) MG
1 TABLET TABLET ORAL ONCE A DAY
Status: ACTIVE | COMMUNITY

## 2021-04-13 RX ORDER — BUSPIRONE HYDROCHLORIDE 7.5 MG/1
TAKE 1 TABLET BY MOUTH THREE TIMES A DAY AS NEEDED TABLET ORAL
Qty: 90 UNSPECIFIED | Refills: 0 | Status: ACTIVE | COMMUNITY

## 2021-04-13 NOTE — HPI-MIGRATED HPI
;   ;   ;   ;   ;   ;     Bloating/Gas : Continue to admit episodes of abdominal distension, flatulence, or belching since her last office visit.    Last visit (10/27/2020) She continue to admit bloating/gas that is associated with episodes of RUQ abdominal pain.    Admit generalized abdominal bloating and increase belching.  Of note, patient could not take Famotidine and Pilose due to experiencing headaches.  Tums help with burping episodes.  Last visit (8/20/2020) Admit generalized abdominal bloating and increase gas associated with upper abdominal discomfort.  Symptoms occur when she consume fried greasy food.  Occurring once a week since last visit.   She will take Gas-X with severe bloating and gas episodes with relief.     Last visit (7/21/2020)Continue to admit bloating and gas throughout the day. Gas-X help with bloating. Consuming potatoes and raw vegetables has been an aggravating factor, which she has decreased intake. Last visit (1/21/2020) Patient denies less frequent episodes of bloating and gas since her last office visit. She reports that she will experience bloating daily. She describes symptoms as pressure. She admits that the abdominal pressure decreases after a bowel movement but the bloating remains. She reports the bloating increases after eating or drinking. She denies that it depends on what she eats or drinks. Last visit (9/24/2019) Admit bloating and gas with onset in Feb. 2019. Described as generalized abdomina distention and increased flatulence. She has tried Gas-X with relief. H. Pylori ([id number]) with PCP- Negative (reported by patient);   Abnormal MRI :   Last visit (10/27/2020) Patient presents today to review MRI completed on 10/3/2020 and lab results.      Last visit (8/20/2020) Patient did not have MRI due to insurance denial (working on resubmitting).    Last visit (7/21/2020) Patient denies any other MRIs at this time.    Last visit (9/24/2019) Patient presents today at the request of Dr. Page for a consultation of abnormal MRI Abdomen w/wo contrast performed on (8/24/2019) at [facility] revealing--There is elongation of the right hepatic lobe which is likely on the basis of a Riedel's configuration, a normal variation.  No suspicious hepatic mass.  No significant helatic steatosis.  No splenomegaly.  No focal splenic lesion.  No evidence for acute cholecystitis or acute pancreatitis.  Main portal vein at the upper limits of normal in diameter, nonspecific.    Also had abnormal CT Abdomen/Pelvis w/Contrast performed on (05/31/2019) and revealed Mild hepatomegaly measuring 20 cm without other morphologic changes to suggest liver disease or hepatic masses. 2. The spleen is not enlarged however there is moderate severity splenic varices and prominence to the portal vein. Further clinical follow up is recommended.  3. Dominant benign - appearing cyst is seen within the left adnexa measuring 3.9 cm. Dedicated endovaginal pelvic ultrasound would be of benefit for further characterization. 4. Small volume pelvic fluid .    She report having Hepatic Function Panel, Hepatitis Panel, TSH, Vitamin B, and Autoimmune panel performed by PCP on (9/20/2019) and awaiting results.   She has been referred to vascular surgeon (Dr. Collin Torres) following GI work up.;   Dysphagia : Denies any episodes of dysphagia since her last office visit.   She d/c the use of brand Vitron-c High Potency Iron Supplement due to it causing constipation.   Of note, patient state over the past month she has felt right side tongue swelling after she take the second Iron ferrous sulfate 325 mg tablet.  The swelling will last for 2 days and she will skip the second dose, then the third day she will take the second Iron tablet and symptoms return.    She d/c the use of brand Vitron-c High Potency Iron Supplement due to it causing constipation.  Last visit (10/27/2020) Patient continues to deny episodes of dysphagia.   Last visit (8/20/2020) Denies dysphagia.   Last visit (7/21/2020) Admit dysphagia with onset in Jan. 2020 at which time she saw her dentist and was referred to oral surgeon for sensation of right side of her tongue felt swelling and not able to move her tongue as she used to and was recommended Speech therapy.   She has completed 30 sessions of physical therapy with [facility] in [place].  Patient report initially had 100 % improvement, now at 75% due to continuous exercises she has to do daily, but has not been consistent.;   Heartburn : Denies any episodes of heartburn since her last office visit.    Last visit (10/27/2020) Admit control of symptoms with dietary modifications, avoiding tomatoes products.   Last visit (8/20/2020) Admit control of symptoms with dietary modifications, avoiding tomatoes products.   Last visit (7/21/2020)  Admit control of symptoms with dietary modifications, not eating late night and sleeping head elevated. She admits that these adjustments have helped her symptoms.   She denies taking any medications she reports that they gave her headaches.    Of last visit (9/24/2019) Admit heartburn symptoms with onset Feb. 2019.  Described as burning sensation retrosternally.  She report symptoms only occur when consuming acidic fruit/food and tomato based food or having and empty stomach.  She has been sleeping head elevated with some improvement. Also admit improvement since her anemia has been more under control.;   Abdominal Pain : Continue to admit episodes of abdominal pain since her last office visit.   Admit RLQ abdominal pain with onset 2 weeks ago. Described a s a pressure sensation.  Pain is noticeable on palpation or turning right.   Last visit (10/27/2020) Continue to experience RUQ abdominal pain that occur daily and will last throughout the day.  Describe as a discomfort. She will have a stabbing pain that occur once a week in an intermittent pattern.  Tums help the discomfort.  Consuming fatty/greasy food has been an aggravating factor.  Last visit (8/20/2020) Patient presents today for a follow up of pain located in the epigastrium and RUQ.  Described as a discomfort that will last throughout the day with associated bloating and increase gas.  Symptoms occur when she consume fried greasy food.  Occurring once a week since last visit.   She will take Gas-X with severe bloating and gas episodes with relief. Also stretching will help the abdominal discomfort.    Last visit (7/21/2020)  Continue to experience abdominal pain located in the epigastrium and RUQ intermittently throughout the day. Abdominal pressure , intermittent , worse with bread and butter, stress    Last visit (11/19/2019) Patient admits/denies any abdominal pain since her last office visit. Admits a swelling at the IV site which was placed for her colonoscopy. She reports the swelling has improved with warm compresses.   Currently admit 1 normal and formed bowel movement per day.  Denies melena, blood or mucus in stools.    Last visit (9/24/2019) Patient presents today for a follow up of a colonoscopy that was completed on 11/1/2019. Colonoscopy revealed Diverticulosis and first degree hemorrhoids.   Last visit (9/24/2019)  Continue to experiencing abdominal pain located in the epigastrium and RUQ.  Onset (2/2019). Described as a constant pain and pressure in the epigastrium.   RUQ pain described as a "pulling" sensation that will last for minutes at a time intermittently throughout the day.    Admit aggravating factor with bad posture while sitting. Laying down has been a relieving factor.   She has not taken medication for symptoms.    She admit treatment from PCP in May 2019 with Pepcid with some relief in the epigastrium, but d/c due to severe headaches.    Last visit (7/29/2019) Patient continue to experience some abdominal discomfort from her last office visit. Patient did not start taking Omeprazole 20 mg daily. She did not know that she could get the medication OTC so she did not start it.    On last visit 06/21/2019, Patient admits symptoms of abdominal pain that started in January/February 2019. The pain is located epigastric and periumbilical region. She describes the pain as a constant discomfort and tenderness. Eating will trigger the discomfort. Laying down will help to alleviate discomfort.    Associated symptoms include early satiety. Patient states that she will feel fast when she eats. She denies any nausea/vomiting but she will experience some dry heaving.   Patient was taking Pepcid which did slightly help with relief of indigestion but she stopped taking it because she was advised not to take Pepcid with iron. She states that she started taking iron 05/29/2019 and has noticed slightly improvements with her symptoms since starting.  Patient had a CT abd/pelvis done with PCP (results below).  Patient states that since she started taking the iron supplements her bowel movements are more regular, she will have 2 BM per day. She has never noticed any blood, mucus, melena in stools.;   Anemia : 40-Year-old female who resents today to review labs and 6 month follow up of anemia.  She currently denies any iron infusions or blood transfusion since last visit.  She denies any blood or melena in stools, or epigastric pain.    Admits daily fatigue, coldness in feet, lightheadedness, and dizziness.   She d/c the use of brand Vitron-c High Potency Iron Supplement due to it causing constipation.   Of note, patient state over the past month she has felt right side tongue swelling after she take the second Iron ferrous sulfate 325 mg tablet.  The swelling will last for 2 days during this time she will skip the second dose, then the third day she will take the Iron tablet BID and tongue swelling symptoms return.       Last visit (10/27/2020) Most recent labs were completed 8/20/2020 with results listed below.    Last visit (8/20/2020) Most recent labs with OBGYN June 12, 2020 CBC: Hgb (WNL) 13.3, Hct (WNL) 43.1, MCV (WNL) 92, MCHC (Low) 30.9, Platelets (WNL) 284.    She  continue to take Ferrous sulfate 65 fe QD.     Last visit (7/21/2020) She continue to take Iron supplement 1 QD.  Admits a recent CBC or Iron/Ferritin lab work 12/31/2019.    Of last visit (9/24/2019) Most recent labs (9/20/2019) with PCP: Hepatic Function Panel, Iron,  Hepatitis Panel, TSH, Vitamin B, Autoimmune panel performed by PCP on (9/20/2019) and awaiting results.     Last visit (7/29/2019) Patient continues to take iron supplements since May 4 2019  On last visit 06/21/2019, Patient was recently diagnosed by CESILIA Tabor at [facility], and the most recent labs were taken 06/05/2019. Patient currently states that she started taking an iron supplements 05/29/2019 and that she has not had a transfusion. Patient did experience fatigue and light headedness, but after taking the iron she has noticed improvements with the fatigue. Patient admits NSAID use, a history of GI bleeding, any blood or melena in stools, fatigue, cold extremities, dizziness.  Patient states she has not had an EGD before.    CT ABD/PELVIS 1. Mild hepatomegaly measuring 20 cm without other morphologic changes to suggest liver disease o hepatic masses. 2. The spleen is not enlarged however there is moderate severity splenic varices and prominence to the portal vein. Further clinical follow-is recommended. 3. Dominant benign-appeared cyst is seen within the elft adnexa measuring 3.9 cm. Dedicated endovaginal pelvis US would be of benefit for further characterization. 4. Small volume pelvic fluid.   OUTSIDE LABS 06/05/2019 - Dr. Tabor Fecal globin by immunochemistry - negative CMP normal CBC normal except Hemoglobin 9.3 L Hematocrit 32.3 L MCV 76.0 L MCH 21.9 L MCHC 28.8 L RDW 16.8 H Ferritin 5 L Iron, total 16 L % Sat 4 L OUTSIDE LABS 05/28/2019 CMP normal CBC normal except Hemoglobin 9.2 L Hematocrit 32.4 L MCV 77.9 L MCH 22.1 L MCHC 28.4 L RDW 17.0 H Helicobacter pylori - negative;

## 2021-04-14 ENCOUNTER — TELEPHONE ENCOUNTER (OUTPATIENT)
Dept: URBAN - METROPOLITAN AREA CLINIC 35 | Facility: CLINIC | Age: 41
End: 2021-04-14

## 2021-04-27 ENCOUNTER — OFFICE VISIT (OUTPATIENT)
Dept: URBAN - METROPOLITAN AREA CLINIC 35 | Facility: CLINIC | Age: 41
End: 2021-04-27

## 2021-06-07 ENCOUNTER — WEB ENCOUNTER (OUTPATIENT)
Dept: URBAN - METROPOLITAN AREA CLINIC 35 | Facility: CLINIC | Age: 41
End: 2021-06-07

## 2021-06-23 ENCOUNTER — TELEPHONE ENCOUNTER (OUTPATIENT)
Dept: URBAN - METROPOLITAN AREA CLINIC 35 | Facility: CLINIC | Age: 41
End: 2021-06-23

## 2021-07-01 ENCOUNTER — LAB OUTSIDE AN ENCOUNTER (OUTPATIENT)
Dept: URBAN - METROPOLITAN AREA CLINIC 35 | Facility: CLINIC | Age: 41
End: 2021-07-01

## 2021-07-02 LAB
% SATURATION: 27
ABSOLUTE BASOPHILS: 68
ABSOLUTE EOSINOPHILS: 252
ABSOLUTE LYMPHOCYTES: 1707
ABSOLUTE MONOCYTES: 442
ABSOLUTE NEUTROPHILS: 4332
BASOPHILS: 1
EOSINOPHILS: 3.7
FERRITIN: 72
HEMATOCRIT: 43.9
HEMOGLOBIN: 14.6
IRON BINDING CAPACITY: 237
IRON, TOTAL: 65
LYMPHOCYTES: 25.1
MCH: 31.5
MCHC: 33.3
MCV: 94.6
MONOCYTES: 6.5
MPV: 12.7
NEUTROPHILS: 63.7
PLATELET COUNT: 201
RDW: 16.2
RED BLOOD CELL COUNT: 4.64
WHITE BLOOD CELL COUNT: 6.8

## 2021-07-13 ENCOUNTER — OFFICE VISIT (OUTPATIENT)
Dept: URBAN - METROPOLITAN AREA CLINIC 35 | Facility: CLINIC | Age: 41
End: 2021-07-13

## 2021-07-13 VITALS
DIASTOLIC BLOOD PRESSURE: 82 MMHG | WEIGHT: 157 LBS | OXYGEN SATURATION: 98 % | SYSTOLIC BLOOD PRESSURE: 128 MMHG | BODY MASS INDEX: 25.23 KG/M2 | HEART RATE: 90 BPM | HEIGHT: 66 IN

## 2021-07-13 RX ORDER — FERRIC CARBOXYMALTOSE INJECTION 50 MG/ML
AS DIRECTED INJECTION, SOLUTION INTRAVENOUS
Qty: 1 | Refills: 1 | Status: ACTIVE | COMMUNITY
Start: 2021-04-14

## 2021-07-13 RX ORDER — FERROUS SULFATE 325(65) MG
1 TABLET TABLET ORAL ONCE A DAY
Status: ON HOLD | COMMUNITY

## 2021-07-13 RX ORDER — BUSPIRONE HYDROCHLORIDE 7.5 MG/1
TAKE 1 TABLET BY MOUTH THREE TIMES A DAY AS NEEDED TABLET ORAL
Qty: 90 UNSPECIFIED | Refills: 0 | Status: ACTIVE | COMMUNITY

## 2021-07-13 NOTE — HPI-MIGRATED HPI
;   ;   ;   ;   ;   ;     Bloating/Gas : She admits improvement in episodes of bloating/gas.    Last visit (4/13/2021)  Continue to admit episodes of abdominal distension, flatulence, or belching since her last office visit.    Last visit (10/27/2020) She continue to admit bloating/gas that is associated with episodes of RUQ abdominal pain.    Admit generalized abdominal bloating and increase belching.  Of note, patient could not take Famotidine and Pilose due to experiencing headaches.  Tums help with burping episodes.  Last visit (8/20/2020) Admit generalized abdominal bloating and increase gas associated with upper abdominal discomfort.  Symptoms occur when she consume fried greasy food.  Occurring once a week since last visit.   She will take Gas-X with severe bloating and gas episodes with relief.     Last visit (7/21/2020)Continue to admit bloating and gas throughout the day. Gas-X help with bloating. Consuming potatoes and raw vegetables has been an aggravating factor, which she has decreased intake. Last visit (1/21/2020) Patient denies less frequent episodes of bloating and gas since her last office visit. She reports that she will experience bloating daily. She describes symptoms as pressure. She admits that the abdominal pressure decreases after a bowel movement but the bloating remains. She reports the bloating increases after eating or drinking. She denies that it depends on what she eats or drinks. Last visit (9/24/2019) Admit bloating and gas with onset in Feb. 2019. Described as generalized abdomina distention and increased flatulence. She has tried Gas-X with relief. H. Pylori ([id number]/[id number]) with PCP- Negative (reported by patient);   Abnormal MRI : She reports that she had a CT Abd completed at [facility] in April 2021. Per patient results were normal.    Last visit (10/27/2020) Patient presents today to review MRI completed on 10/3/2020 and lab results.      Last visit (8/20/2020) Patient did not have MRI due to insurance denial (working on resubmitting).    Last visit (7/21/2020) Patient denies any other MRIs at this time.    Last visit (9/24/2019) Patient presents today at the request of Dr. Senthivel for a consultation of abnormal MRI Abdomen w/wo contrast performed on (8/24/2019) at [facility] revealing--There is elongation of the right hepatic lobe which is likely on the basis of a Riedel's configuration, a normal variation.  No suspicious hepatic mass.  No significant helatic steatosis.  No splenomegaly.  No focal splenic lesion.  No evidence for acute cholecystitis or acute pancreatitis.  Main portal vein at the upper limits of normal in diameter, nonspecific.    Also had abnormal CT Abdomen/Pelvis w/Contrast performed on (05/31/2019) and revealed Mild hepatomegaly measuring 20 cm without other morphologic changes to suggest liver disease or hepatic masses. 2. The spleen is not enlarged however there is moderate severity splenic varices and prominence to the portal vein. Further clinical follow up is recommended.  3. Dominant benign - appearing cyst is seen within the left adnexa measuring 3.9 cm. Dedicated endovaginal pelvic ultrasound would be of benefit for further characterization. 4. Small volume pelvic fluid .    She report having Hepatic Function Panel, Hepatitis Panel, TSH, Vitamin B, and Autoimmune panel performed by PCP on (9/20/2019) and awaiting results.   She has been referred to vascular surgeon (Dr. Collin Torres) following GI work up.;   Dysphagia : She reports that dysphagia has resolved.    Last visit (4/13/2021)  Denies any episodes of dysphagia since her last office visit.   She d/c the use of brand Vitron-c High Potency Iron Supplement due to it causing constipation.   Of note, patient state over the past month she has felt right side tongue swelling after she take the second Iron ferrous sulfate 325 mg tablet.  The swelling will last for 2 days and she will skip the second dose, then the third day she will take the second Iron tablet and symptoms return.    She d/c the use of brand Vitron-c High Potency Iron Supplement due to it causing constipation.  Last visit (10/27/2020) Patient continues to deny episodes of dysphagia.   Last visit (8/20/2020) Denies dysphagia.   Last visit (7/21/2020) Admit dysphagia with onset in Jan. 2020 at which time she saw her dentist and was referred to oral surgeon for sensation of right side of her tongue felt swelling and not able to move her tongue as she used to and was recommended Speech therapy.   She has completed 30 sessions of physical therapy with [facility] in [place].  Patient report initially had 100 % improvement, now at 75% due to continuous exercises she has to do daily, but has not been consistent.;   Heartburn : Patient denies any symptoms of heartburn at this time.    Last visit (4/13/2021)  Denies any episodes of heartburn since her last office visit.    Last visit (10/27/2020) Admit control of symptoms with dietary modifications, avoiding tomatoes products.   Last visit (8/20/2020) Admit control of symptoms with dietary modifications, avoiding tomatoes products.   Last visit (7/21/2020)  Admit control of symptoms with dietary modifications, not eating late night and sleeping head elevated. She admits that these adjustments have helped her symptoms.   She denies taking any medications she reports that they gave her headaches.    Of last visit (9/24/2019) Admit heartburn symptoms with onset Feb. 2019.  Described as burning sensation retrosternally.  She report symptoms only occur when consuming acidic fruit/food and tomato based food or having and empty stomach.  She has been sleeping head elevated with some improvement. Also admit improvement since her anemia has been more under control.;   Abdominal Pain : Patient reports that the abdominal pain has resolved.   Last visit (4/13/2021)  Continue to admit episodes of abdominal pain since her last office visit.   Admit RLQ abdominal pain with onset 2 weeks ago. Described a s a pressure sensation.  Pain is noticeable on palpation or turning right.   Last visit (10/27/2020) Continue to experience RUQ abdominal pain that occur daily and will last throughout the day.  Describe as a discomfort. She will have a stabbing pain that occur once a week in an intermittent pattern.  Tums help the discomfort.  Consuming fatty/greasy food has been an aggravating factor.  Last visit (8/20/2020) Patient presents today for a follow up of pain located in the epigastrium and RUQ.  Described as a discomfort that will last throughout the day with associated bloating and increase gas.  Symptoms occur when she consume fried greasy food.  Occurring once a week since last visit.   She will take Gas-X with severe bloating and gas episodes with relief. Also stretching will help the abdominal discomfort.    Last visit (7/21/2020)  Continue to experience abdominal pain located in the epigastrium and RUQ intermittently throughout the day. Abdominal pressure , intermittent , worse with bread and butter, stress    Last visit (11/19/2019) Patient admits/denies any abdominal pain since her last office visit. Admits a swelling at the IV site which was placed for her colonoscopy. She reports the swelling has improved with warm compresses.   Currently admit 1 normal and formed bowel movement per day.  Denies melena, blood or mucus in stools.    Last visit (9/24/2019) Patient presents today for a follow up of a colonoscopy that was completed on 11/1/2019. Colonoscopy revealed Diverticulosis and first degree hemorrhoids.   Last visit (9/24/2019)  Continue to experiencing abdominal pain located in the epigastrium and RUQ.  Onset (2/2019). Described as a constant pain and pressure in the epigastrium.   RUQ pain described as a "pulling" sensation that will last for minutes at a time intermittently throughout the day.    Admit aggravating factor with bad posture while sitting. Laying down has been a relieving factor.   She has not taken medication for symptoms.    She admit treatment from PCP in May 2019 with Pepcid with some relief in the epigastrium, but d/c due to severe headaches.    Last visit (7/29/2019) Patient continue to experience some abdominal discomfort from her last office visit. Patient did not start taking Omeprazole 20 mg daily. She did not know that she could get the medication OTC so she did not start it.    On last visit 06/21/2019, Patient admits symptoms of abdominal pain that started in January/February 2019. The pain is located epigastric and periumbilical region. She describes the pain as a constant discomfort and tenderness. Eating will trigger the discomfort. Laying down will help to alleviate discomfort.    Associated symptoms include early satiety. Patient states that she will feel fast when she eats. She denies any nausea/vomiting but she will experience some dry heaving.   Patient was taking Pepcid which did slightly help with relief of indigestion but she stopped taking it because she was advised not to take Pepcid with iron. She states that she started taking iron 05/29/2019 and has noticed slightly improvements with her symptoms since starting.  Patient had a CT abd/pelvis done with PCP (results below).  Patient states that since she started taking the iron supplements her bowel movements are more regular, she will have 2 BM per day. She has never noticed any blood, mucus, melena in stools.;   Anemia : Patient presents today for a follow up of anemia.   She admits the last dose of Injectafer  mg was 4/2021.   Of last note SBE discussed but she prefers to defer it presently. Wants to pursue iron replacement.     Last visit (4/13/2021)  40-Year-old female who resents today to review labs and 6 month follow up of anemia.  She currently denies any iron infusions or blood transfusion since last visit.  She denies any blood or melena in stools, or epigastric pain.    Admits daily fatigue, coldness in feet, lightheadedness, and dizziness.   She d/c the use of brand Vitron-c High Potency Iron Supplement due to it causing constipation.   Of note, patient state over the past month she has felt right side tongue swelling after she take the second Iron ferrous sulfate 325 mg tablet.  The swelling will last for 2 days during this time she will skip the second dose, then the third day she will take the Iron tablet BID and tongue swelling symptoms return.       Last visit (10/27/2020) Most recent labs were completed 8/20/2020 with results listed below.    Last visit (8/20/2020) Most recent labs with [facility] June 12, 2020 CBC: Hgb (WNL) 13.3, Hct (WNL) 43.1, MCV (WNL) 92, MCHC (Low) 30.9, Platelets (WNL) 284.    She  continue to take Ferrous sulfate 65 fe QD.     Last visit (7/21/2020) She continue to take Iron supplement 1 QD.  Admits a recent CBC or Iron/Ferritin lab work 12/31/2019.    Of last visit (9/24/2019) Most recent labs (9/20/2019) with PCP: Hepatic Function Panel, Iron,  Hepatitis Panel, TSH, Vitamin B, Autoimmune panel performed by PCP on (9/20/2019) and awaiting results.     Last visit (7/29/2019) Patient continues to take iron supplements since May 4 2019  On last visit 06/21/2019, Patient was recently diagnosed by CESILIA Tabor at [facility], and the most recent labs were taken 06/05/2019. Patient currently states that she started taking an iron supplements 05/29/2019 and that she has not had a transfusion. Patient did experience fatigue and light headedness, but after taking the iron she has noticed improvements with the fatigue. Patient admits NSAID use, a history of GI bleeding, any blood or melena in stools, fatigue, cold extremities, dizziness.  Patient states she has not had an EGD before.    CT ABD/PELVIS 1. Mild hepatomegaly measuring 20 cm without other morphologic changes to suggest liver disease o hepatic masses. 2. The spleen is not enlarged however there is moderate severity splenic varices and prominence to the portal vein. Further clinical follow-is recommended. 3. Dominant benign-appeared cyst is seen within the elft adnexa measuring 3.9 cm. Dedicated endovaginal pelvis US would be of benefit for further characterization. 4. Small volume pelvic fluid.   OUTSIDE LABS 06/05/2019 - Dr. Tabor Fecal globin by immunochemistry - negative CMP normal CBC normal except Hemoglobin 9.3 L Hematocrit 32.3 L MCV 76.0 L MCH 21.9 L MCHC 28.8 L RDW 16.8 H Ferritin 5 L Iron, total 16 L % Sat 4 L OUTSIDE LABS 05/28/2019 CMP normal CBC normal except Hemoglobin 9.2 L Hematocrit 32.4 L MCV 77.9 L MCH 22.1 L MCHC 28.4 L RDW 17.0 H Helicobacter pylori - negative;

## 2021-09-21 ENCOUNTER — TELEPHONE ENCOUNTER (OUTPATIENT)
Dept: URBAN - METROPOLITAN AREA CLINIC 35 | Facility: CLINIC | Age: 41
End: 2021-09-21

## 2021-12-21 ENCOUNTER — TELEPHONE ENCOUNTER (OUTPATIENT)
Dept: URBAN - METROPOLITAN AREA CLINIC 36 | Facility: CLINIC | Age: 41
End: 2021-12-21

## 2021-12-27 ENCOUNTER — LAB OUTSIDE AN ENCOUNTER (OUTPATIENT)
Dept: URBAN - METROPOLITAN AREA CLINIC 36 | Facility: CLINIC | Age: 41
End: 2021-12-27

## 2022-01-04 ENCOUNTER — LAB OUTSIDE AN ENCOUNTER (OUTPATIENT)
Dept: URBAN - METROPOLITAN AREA CLINIC 35 | Facility: CLINIC | Age: 42
End: 2022-01-04

## 2022-01-11 ENCOUNTER — OFFICE VISIT (OUTPATIENT)
Dept: URBAN - METROPOLITAN AREA CLINIC 35 | Facility: CLINIC | Age: 42
End: 2022-01-11
Payer: COMMERCIAL

## 2022-01-11 VITALS — WEIGHT: 155 LBS | BODY MASS INDEX: 24.91 KG/M2 | HEIGHT: 66 IN

## 2022-01-11 DIAGNOSIS — R10.11 RIGHT UPPER QUADRANT PAIN: ICD-10-CM

## 2022-01-11 DIAGNOSIS — R93.5 ABNORMAL FINDINGS ON DIAGNOSTIC IMAGING OF OTHER ABDOMINAL REGIONS, INCLUDING RETROPERITONEUM: ICD-10-CM

## 2022-01-11 DIAGNOSIS — R16.0 HEPATOMEGALY: ICD-10-CM

## 2022-01-11 DIAGNOSIS — R14.3 FLATULENCE: ICD-10-CM

## 2022-01-11 DIAGNOSIS — R10.13 EPIGASTRIC PAIN: ICD-10-CM

## 2022-01-11 DIAGNOSIS — D50.9 IRON DEFICIENCY ANEMIA, UNSPECIFIED: ICD-10-CM

## 2022-01-11 DIAGNOSIS — R14.0 ABDOMINAL DISTENSION (GASEOUS): ICD-10-CM

## 2022-01-11 PROCEDURE — 99213 OFFICE O/P EST LOW 20 MIN: CPT | Performed by: INTERNAL MEDICINE

## 2022-01-11 RX ORDER — FERROUS SULFATE 325(65) MG
1 TABLET TABLET ORAL ONCE A DAY
Status: DISCONTINUED | COMMUNITY

## 2022-01-11 RX ORDER — FERRIC CARBOXYMALTOSE INJECTION 50 MG/ML
AS DIRECTED INJECTION, SOLUTION INTRAVENOUS
Qty: 1 | Refills: 1 | Status: DISCONTINUED | COMMUNITY
Start: 2021-04-14

## 2022-01-11 RX ORDER — FERROUS FUMARATE AND POLYSACCHRIDE IRON VITAMIN MINERAL COMPLEX SUPPLEMENT 191.2; 135.9; 1; 210; 20; 5; 5; 7; 25; 3 MG/1; MG/1; MG/1; MG/1; MG/1; MG/1; MG/1; MG/1; MG/1; UG/1
1 CAPSULE CAPSULE ORAL ONCE A DAY
Qty: 90 CAPSULE | Refills: 3 | OUTPATIENT
Start: 2022-01-11

## 2022-01-11 RX ORDER — BUSPIRONE HYDROCHLORIDE 7.5 MG/1
TAKE 1 TABLET BY MOUTH THREE TIMES A DAY AS NEEDED TABLET ORAL
Qty: 90 UNSPECIFIED | Refills: 0 | Status: ACTIVE | COMMUNITY

## 2022-01-11 NOTE — HPI-ABNORMAL FINDINGS
Denies any imaging since last visit.   Last visit (7/13/2021) She reports that she had a CT Abd completed at [facility] in April 2021. Per patient results were normal.         Last visit (10/27/2020)        Patient presents today to review MRI completed on 10/3/2020 and lab results.         Last visit (8/20/2020)        Patient did not have MRI due to insurance denial (working on resubmitting).         Last visit (7/21/2020) Patient denies any other MRIs at this time.         Last visit (9/24/2019) Patient presents today at the request of Dr. Page for a consultation of abnormal MRI Abdomen w/wo contrast performed on (8/24/2019) at [facility] revealing--There is elongation of the right hepatic lobe which is likely on the basis of a Riedel's configuration, a normal variation. No suspicious hepatic mass. No significant helatic steatosis. No splenomegaly. No focal splenic lesion. No evidence for acute cholecystitis or acute pancreatitis. Main portal vein at the upper limits of normal in diameter, nonspecific.        Also had abnormal CT Abdomen/Pelvis w/Contrast performed on (05/31/2019) and revealed Mild hepatomegaly measuring 20 cm without other morphologic changes to suggest liver disease or hepatic masses.        2. The spleen is not enlarged however there is moderate severity splenic varices and prominence to the portal vein. Further clinical follow up is recommended.         3. Dominant benign - appearing cyst is seen within the left adnexa measuring 3.9 cm. Dedicated endovaginal pelvic ultrasound would be of benefit for further characterization.        4. Small volume pelvic fluid .         She report having Hepatic Function Panel, Hepatitis Panel, TSH, Vitamin B, and Autoimmune panel performed by PCP on (9/20/2019) and awaiting results.         She has been referred to vascular surgeon (Dr. Collin Torres) following GI work up.

## 2022-01-11 NOTE — HPI-ANEMIA
Patient presents today to review labs and follow up of anemia.  She state she has been feeling well and without fatigue, coldness, lightheadedness, or dizziness.    Last visit (7/13/2021) Patient presents today for a follow up of anemia.         She admits the last dose of Injectafer  mg was  (4/2021).         Of last note SBE discussed but she prefers to defer it presently. Wants to pursue iron replacement.    Visit (4/13/2021)         40-Year-old female who resents today to review labs and 6 month follow up of anemia. She currently denies any iron infusions or blood transfusion since last visit. She denies any blood or melena in stools, or epigastric pain.         Admits daily fatigue, coldness in feet, lightheadedness, and dizziness.        She d/c the use of brand Vitron-c High Potency Iron Supplement due to it causing constipation.        Of note, patient state over the past month she has felt right side tongue swelling after she take the second Iron ferrous sulfate 325 mg tablet. The swelling will last for 2 days during this time she will skip the second dose, then the third day she will take the Iron tablet BID and tongue swelling symptoms return.         Last visit (10/27/2020)        Most recent labs were completed 8/20/2020 with results listed below.         Last visit (8/20/2020)        Most recent labs with OBGYN June 12, 2020 CBC: Hgb (WNL) 13.3, Hct (WNL) 43.1, MCV (WNL) 92, MCHC (Low) 30.9, Platelets (WNL) 284.         She continue to take Ferrous sulfate 65 fe QD.  Visit (7/21/2020) She continue to take Iron supplement 1 QD. Admits a recent CBC or Iron/Ferritin lab work 12/31/2019.         Of last visit (9/24/2019) Most recent labs (9/20/2019) with PCP: Hepatic Function Panel, Iron, Hepatitis Panel, TSH, Vitamin B, Autoimmune panel performed by PCP on (9/20/2019) and awaiting results.         Last visit (7/29/2019) Patient continues to take iron supplements since May 4 2019        On last visit 06/21/2019, Patient was recently diagnosed by CESILIA Tabor at [facility], and the most recent labs were taken 06/05/2019. Patient currently states that she started taking an iron supplements 05/29/2019 and that she has not had a transfusion. Patient did experience fatigue and light headedness, but after taking the iron she has noticed improvements with the fatigue. Patient admits NSAID use, a history of GI bleeding, any blood or melena in stools, fatigue, cold extremities, dizziness. Patient states she has not had an EGD before.         CT ABD/PELVIS        1. Mild hepatomegaly measuring 20 cm without other morphologic changes to suggest liver disease o hepatic masses.        2. The spleen is not enlarged however there is moderate severity splenic varices and prominence to the portal vein. Further clinical follow-is recommended.        3. Dominant benign-appeared cyst is seen within the elft adnexa measuring 3.9 cm. Dedicated endovaginal pelvis US would be of benefit for further characterization.        4. Small volume pelvic fluid.         OUTSIDE LABS 06/05/2019 - Dr. Tabor        Fecal globin by immunochemistry - negative        CMP normal        CBC normal except        Hemoglobin 9.3 L        Hematocrit 32.3 L        MCV 76.0 L        MCH 21.9 L        MCHC 28.8 L        RDW 16.8 H        Ferritin 5 L        Iron, total 16 L        % Sat 4 L        OUTSIDE LABS 05/28/2019        CMP normal        CBC normal except        Hemoglobin 9.2 L        Hematocrit 32.4 L        MCV 77.9 L        MCH 22.1 L        MCHC 28.4 L        RDW 17.0 H        Helicobacter pylori - negative.

## 2022-01-11 NOTE — HPI-HEARTBURN
Continue to deny heartburn symptoms.  Last visit (7/13/2021)    Patient denies any symptoms of heartburn at this time.         Last visit (4/13/2021)         Denies any episodes of heartburn since her last office visit.         Last visit (10/27/2020)        Admit control of symptoms with dietary modifications, avoiding tomatoes products.        Last visit (8/20/2020)        Admit control of symptoms with dietary modifications, avoiding tomatoes products.        Last visit (7/21/2020) Admit control of symptoms with dietary modifications, not eating late night and sleeping head elevated. She admits that these adjustments have helped her symptoms.         She denies taking any medications she reports that they gave her headaches.         Of last visit (9/24/2019) Admit heartburn symptoms with onset Feb. 2019. Described as burning sensation retrosternally. She report symptoms only occur when consuming acidic fruit/food and tomato based food or having and empty stomach.        She has been sleeping head elevated with some improvement. Also admit improvement since her anemia has been more under control

## 2022-01-11 NOTE — HPI-BLOATING/GAS
Deny episodes of bloating/gas since last visit.   Last visit (7/13/2021)  She admits improvement in episodes of bloating/gas.         Last visit (4/13/2021)         Continue to admit episodes of abdominal distension, flatulence, or belching since her last office visit.         Last visit (10/27/2020)        She continue to admit bloating/gas that is associated with episodes of RUQ abdominal pain.         Admit generalized abdominal bloating and increase belching.        Of note, patient could not take Famotidine and Pilose due to experiencing headaches. Tums help with burping episodes.        Last visit (8/20/2020)        Admit generalized abdominal bloating and increase gas associated with upper abdominal discomfort. Symptoms occur when she consume fried greasy food.        Occurring once a week since last visit. She will take Gas-X with severe bloating and gas episodes with relief.         Last visit (7/21/2020)Continue to admit bloating and gas throughout the day. Gas-X help with bloating. Consuming potatoes and raw vegetables has been an aggravating factor, which she has decreased intake. Last visit (1/21/2020) Patient denies less frequent episodes of bloating and gas since her last office visit. She reports that she will experience bloating daily. She describes symptoms as pressure. She admits that the abdominal pressure decreases after a bowel movement but the bloating remains. She reports the bloating increases after eating or drinking. She denies that it depends on what she eats or drinks. Last visit (9/24/2019) Admit bloating and gas with onset in Feb. 2019. Described as generalized abdomina distention and increased flatulence. She has tried Gas-X with relief. H. Pylori ([id number]/212019) with PCP- Negative (reported by patient).

## 2023-01-02 ENCOUNTER — LAB OUTSIDE AN ENCOUNTER (OUTPATIENT)
Dept: URBAN - METROPOLITAN AREA CLINIC 35 | Facility: CLINIC | Age: 43
End: 2023-01-02

## 2023-01-10 ENCOUNTER — OFFICE VISIT (OUTPATIENT)
Dept: URBAN - METROPOLITAN AREA CLINIC 35 | Facility: CLINIC | Age: 43
End: 2023-01-10

## 2023-01-19 ENCOUNTER — WEB ENCOUNTER (OUTPATIENT)
Dept: URBAN - METROPOLITAN AREA CLINIC 35 | Facility: CLINIC | Age: 43
End: 2023-01-19

## 2023-01-19 ENCOUNTER — TELEPHONE ENCOUNTER (OUTPATIENT)
Dept: URBAN - METROPOLITAN AREA CLINIC 6 | Facility: CLINIC | Age: 43
End: 2023-01-19

## 2023-01-26 ENCOUNTER — WEB ENCOUNTER (OUTPATIENT)
Dept: URBAN - METROPOLITAN AREA CLINIC 35 | Facility: CLINIC | Age: 43
End: 2023-01-26

## 2023-01-26 ENCOUNTER — TELEPHONE ENCOUNTER (OUTPATIENT)
Dept: URBAN - METROPOLITAN AREA CLINIC 33 | Facility: CLINIC | Age: 43
End: 2023-01-26

## 2023-01-26 PROBLEM — 87522002 IRON DEFICIENCY ANEMIA: Status: ACTIVE | Noted: 2019-06-21

## 2023-01-27 LAB
% SATURATION: 7
ABSOLUTE BASOPHILS: 78
ABSOLUTE EOSINOPHILS: 140
ABSOLUTE LYMPHOCYTES: 1833
ABSOLUTE MONOCYTES: 491
ABSOLUTE NEUTROPHILS: 5257
BASOPHILS: 1
EOSINOPHILS: 1.8
FERRITIN: 6
HEMATOCRIT: 40
HEMOGLOBIN: 12.7
IRON BINDING CAPACITY: 398
IRON, TOTAL: 27
LYMPHOCYTES: 23.5
MCH: 27.8
MCHC: 31.8
MCV: 87.5
MONOCYTES: 6.3
MPV: 12.1
NEUTROPHILS: 67.4
PLATELET COUNT: 288
RDW: 14.3
RED BLOOD CELL COUNT: 4.57
WHITE BLOOD CELL COUNT: 7.8

## 2023-01-31 ENCOUNTER — CLAIMS CREATED FROM THE CLAIM WINDOW (OUTPATIENT)
Dept: URBAN - METROPOLITAN AREA CLINIC 35 | Facility: CLINIC | Age: 43
End: 2023-01-31
Payer: COMMERCIAL

## 2023-01-31 ENCOUNTER — WEB ENCOUNTER (OUTPATIENT)
Dept: URBAN - METROPOLITAN AREA CLINIC 35 | Facility: CLINIC | Age: 43
End: 2023-01-31

## 2023-01-31 ENCOUNTER — TELEPHONE ENCOUNTER (OUTPATIENT)
Dept: URBAN - METROPOLITAN AREA CLINIC 33 | Facility: CLINIC | Age: 43
End: 2023-01-31

## 2023-01-31 VITALS
SYSTOLIC BLOOD PRESSURE: 116 MMHG | BODY MASS INDEX: 24.94 KG/M2 | WEIGHT: 155.2 LBS | OXYGEN SATURATION: 100 % | HEART RATE: 108 BPM | HEIGHT: 66 IN | DIASTOLIC BLOOD PRESSURE: 77 MMHG

## 2023-01-31 DIAGNOSIS — D50.9 IRON DEFICIENCY ANEMIA, UNSPECIFIED: ICD-10-CM

## 2023-01-31 PROCEDURE — 99214 OFFICE O/P EST MOD 30 MIN: CPT | Performed by: INTERNAL MEDICINE

## 2023-01-31 RX ORDER — FERROUS FUMARATE AND POLYSACCHRIDE IRON VITAMIN MINERAL COMPLEX SUPPLEMENT 191.2; 135.9; 1; 210; 20; 5; 5; 7; 25; 3 MG/1; MG/1; MG/1; MG/1; MG/1; MG/1; MG/1; MG/1; MG/1; UG/1
1 CAPSULE CAPSULE ORAL ONCE A DAY
Qty: 90 CAPSULE | Refills: 3 | OUTPATIENT
Start: 2022-01-11

## 2023-01-31 RX ORDER — FERROUS FUMARATE AND POLYSACCHRIDE IRON VITAMIN MINERAL COMPLEX SUPPLEMENT 191.2; 135.9; 1; 210; 20; 5; 5; 7; 25; 3 MG/1; MG/1; MG/1; MG/1; MG/1; MG/1; MG/1; MG/1; MG/1; UG/1
1 CAPSULE CAPSULE ORAL ONCE A DAY
Qty: 90 CAPSULE | Refills: 3 | Status: ACTIVE | COMMUNITY
Start: 2022-01-11

## 2023-01-31 RX ORDER — BUSPIRONE HYDROCHLORIDE 7.5 MG/1
TAKE 1 TABLET BY MOUTH THREE TIMES A DAY AS NEEDED TABLET ORAL
Qty: 90 UNSPECIFIED | Refills: 0 | Status: ACTIVE | COMMUNITY

## 2023-01-31 NOTE — HPI-ANEMIA
Patient presents today for a follow up of anemia. She currently admits taking OTC iron supplements. Patient denies any blood or melena in stools, epigastric/abdominal pains, cold extremities, lightheadedness, dizziness, shortness of breath or headaches. She admits symptoms of abdominal cramping and fatigue.   Most recent labs were completed 01.26.2023.     (Last Visit 01.11.2022) Patient presents today to review labs and follow up of anemia.  She state she has been feeling well and without fatigue, coldness, lightheadedness, or dizziness.    Last visit (7/13/2021) Patient presents today for a follow up of anemia.         She admits the last dose of Injectafer  mg was  (4/2021).         Of last note SBE discussed but she prefers to defer it presently. Wants to pursue iron replacement.    Visit (4/13/2021)         40-Year-old female who resents today to review labs and 6 month follow up of anemia. She currently denies any iron infusions or blood transfusion since last visit. She denies any blood or melena in stools, or epigastric pain.         Admits daily fatigue, coldness in feet, lightheadedness, and dizziness.        She d/c the use of brand Vitron-c High Potency Iron Supplement due to it causing constipation.        Of note, patient state over the past month she has felt right side tongue swelling after she take the second Iron ferrous sulfate 325 mg tablet. The swelling will last for 2 days during this time she will skip the second dose, then the third day she will take the Iron tablet BID and tongue swelling symptoms return.         Last visit (10/27/2020)        Most recent labs were completed 8/20/2020 with results listed below.         Last visit (8/20/2020)        Most recent labs with [facility] June 12, 2020 CBC: Hgb (WNL) 13.3, Hct (WNL) 43.1, MCV (WNL) 92, MCHC (Low) 30.9, Platelets (WNL) 284.         She continue to take Ferrous sulfate 65 fe QD.  Visit (7/21/2020) She continue to take Iron supplement 1 QD. Admits a recent CBC or Iron/Ferritin lab work 12/31/2019.         Of last visit (9/24/2019) Most recent labs (9/20/2019) with PCP: Hepatic Function Panel, Iron, Hepatitis Panel, TSH, Vitamin B, Autoimmune panel performed by PCP on (9/20/2019) and awaiting results.         Last visit (7/29/2019) Patient continues to take iron supplements since May 4 2019        On last visit 06/21/2019, Patient was recently diagnosed by CESILIA Tabor at [facility], and the most recent labs were taken 06/05/2019. Patient currently states that she started taking an iron supplements 05/29/2019 and that she has not had a transfusion. Patient did experience fatigue and light headedness, but after taking the iron she has noticed improvements with the fatigue. Patient admits NSAID use, a history of GI bleeding, any blood or melena in stools, fatigue, cold extremities, dizziness. Patient states she has not had an EGD before.         CT ABD/PELVIS        1. Mild hepatomegaly measuring 20 cm without other morphologic changes to suggest liver disease o hepatic masses.        2. The spleen is not enlarged however there is moderate severity splenic varices and prominence to the portal vein. Further clinical follow-is recommended.        3. Dominant benign-appeared cyst is seen within the elft adnexa measuring 3.9 cm. Dedicated endovaginal pelvis US would be of benefit for further characterization.        4. Small volume pelvic fluid.         OUTSIDE LABS 06/05/2019 - Dr. Tabor        Fecal globin by immunochemistry - negative        CMP normal        CBC normal except        Hemoglobin 9.3 L        Hematocrit 32.3 L        MCV 76.0 L        MCH 21.9 L        MCHC 28.8 L        RDW 16.8 H        Ferritin 5 L        Iron, total 16 L        % Sat 4 L        OUTSIDE LABS 05/28/2019        CMP normal        CBC normal except        Hemoglobin 9.2 L        Hematocrit 32.4 L        MCV 77.9 L        MCH 22.1 L        MCHC 28.4 L        RDW 17.0 H        Helicobacter pylori - negative.

## 2023-01-31 NOTE — HPI-ABNORMAL FINDINGS
She denies any recent imagings.  (Last Visit 01.11.2022) Denies any imaging since last visit.   Last visit (7/13/2021) She reports that she had a CT Abd completed at [facility] in April 2021. Per patient results were normal.         Last visit (10/27/2020)        Patient presents today to review MRI completed on 10/3/2020 and lab results.         Last visit (8/20/2020)        Patient did not have MRI due to insurance denial (working on resubmitting).         Last visit (7/21/2020) Patient denies any other MRIs at this time.         Last visit (9/24/2019) Patient presents today at the request of Dr. Page for a consultation of abnormal MRI Abdomen w/wo contrast performed on (8/24/2019) at [facility] revealing--There is elongation of the right hepatic lobe which is likely on the basis of a Riedel's configuration, a normal variation. No suspicious hepatic mass. No significant helatic steatosis. No splenomegaly. No focal splenic lesion. No evidence for acute cholecystitis or acute pancreatitis. Main portal vein at the upper limits of normal in diameter, nonspecific.        Also had abnormal CT Abdomen/Pelvis w/Contrast performed on (05/31/2019) and revealed Mild hepatomegaly measuring 20 cm without other morphologic changes to suggest liver disease or hepatic masses.        2. The spleen is not enlarged however there is moderate severity splenic varices and prominence to the portal vein. Further clinical follow up is recommended.         3. Dominant benign - appearing cyst is seen within the left adnexa measuring 3.9 cm. Dedicated endovaginal pelvic ultrasound would be of benefit for further characterization.        4. Small volume pelvic fluid .         She report having Hepatic Function Panel, Hepatitis Panel, TSH, Vitamin B, and Autoimmune panel performed by PCP on (9/20/2019) and awaiting results.         She has been referred to vascular surgeon (Dr. Collin Torres) following GI work up.

## 2023-01-31 NOTE — HPI-BLOATING/GAS
She continues to admits intermitten bloating/gas symptoms..     (Last Visit 01.11.2022) Deny episodes of bloating/gas since last visit.   Last visit (7/13/2021)  She admits improvement in episodes of bloating/gas.         Last visit (4/13/2021)         Continue to admit episodes of abdominal distension, flatulence, or belching since her last office visit.         Last visit (10/27/2020)        She continue to admit bloating/gas that is associated with episodes of RUQ abdominal pain.         Admit generalized abdominal bloating and increase belching.        Of note, patient could not take Famotidine and Pilose due to experiencing headaches. Tums help with burping episodes.        Last visit (8/20/2020)        Admit generalized abdominal bloating and increase gas associated with upper abdominal discomfort. Symptoms occur when she consume fried greasy food.        Occurring once a week since last visit. She will take Gas-X with severe bloating and gas episodes with relief.         Last visit (7/21/2020)Continue to admit bloating and gas throughout the day. Gas-X help with bloating. Consuming potatoes and raw vegetables has been an aggravating factor, which she has decreased intake. Last visit (1/21/2020) Patient denies less frequent episodes of bloating and gas since her last office visit. She reports that she will experience bloating daily. She describes symptoms as pressure. She admits that the abdominal pressure decreases after a bowel movement but the bloating remains. She reports the bloating increases after eating or drinking. She denies that it depends on what she eats or drinks. Last visit (9/24/2019) Admit bloating and gas with onset in Feb. 2019. Described as generalized abdomina distention and increased flatulence. She has tried Gas-X with relief. H. Pylori ([id number]) with PCP- Negative (reported by patient).

## 2023-01-31 NOTE — HPI-HEARTBURN
Denies any heartburn symptoms at this time.   (Last Visit 01.11.2022) Continue to deny heartburn symptoms.  Last visit (7/13/2021)    Patient denies any symptoms of heartburn at this time.         Last visit (4/13/2021)         Denies any episodes of heartburn since her last office visit.         Last visit (10/27/2020)        Admit control of symptoms with dietary modifications, avoiding tomatoes products.        Last visit (8/20/2020)        Admit control of symptoms with dietary modifications, avoiding tomatoes products.        Last visit (7/21/2020) Admit control of symptoms with dietary modifications, not eating late night and sleeping head elevated. She admits that these adjustments have helped her symptoms.         She denies taking any medications she reports that they gave her headaches.         Of last visit (9/24/2019) Admit heartburn symptoms with onset Feb. 2019. Described as burning sensation retrosternally. She report symptoms only occur when consuming acidic fruit/food and tomato based food or having and empty stomach.        She has been sleeping head elevated with some improvement. Also admit improvement since her anemia has been more under control

## 2023-04-28 ENCOUNTER — WEB ENCOUNTER (OUTPATIENT)
Dept: URBAN - METROPOLITAN AREA CLINIC 35 | Facility: CLINIC | Age: 43
End: 2023-04-28

## 2023-05-15 ENCOUNTER — LAB OUTSIDE AN ENCOUNTER (OUTPATIENT)
Dept: URBAN - METROPOLITAN AREA CLINIC 35 | Facility: CLINIC | Age: 43
End: 2023-05-15

## 2023-05-23 LAB
ABSOLUTE BASOPHILS: 90
ABSOLUTE EOSINOPHILS: 162
ABSOLUTE LYMPHOCYTES: 1722
ABSOLUTE MONOCYTES: 408
ABSOLUTE NEUTROPHILS: 3618
BASOPHILS: 1.5
EOSINOPHILS: 2.7
FERRITIN, SERUM: 4
HEMATOCRIT: 36
HEMOGLOBIN: 11
IRON BIND.CAP.(TIBC): 406
IRON SATURATION: 5
IRON: 21
LYMPHOCYTES: 28.7
MCH: 25
MCHC: 30.6
MCV: 81.8
MONOCYTES: 6.8
MPV: 12.7
NEUTROPHILS: 60.3
PLATELET COUNT: 247
RDW: 15.5
RED BLOOD CELL COUNT: 4.4
WHITE BLOOD CELL COUNT: 6

## 2023-05-30 ENCOUNTER — OFFICE VISIT (OUTPATIENT)
Dept: URBAN - METROPOLITAN AREA CLINIC 35 | Facility: CLINIC | Age: 43
End: 2023-05-30

## 2023-05-30 ENCOUNTER — OFFICE VISIT (OUTPATIENT)
Dept: URBAN - METROPOLITAN AREA CLINIC 35 | Facility: CLINIC | Age: 43
End: 2023-05-30
Payer: COMMERCIAL

## 2023-05-30 VITALS
SYSTOLIC BLOOD PRESSURE: 102 MMHG | WEIGHT: 152 LBS | OXYGEN SATURATION: 99 % | DIASTOLIC BLOOD PRESSURE: 78 MMHG | BODY MASS INDEX: 24.43 KG/M2 | HEIGHT: 66 IN | HEART RATE: 100 BPM

## 2023-05-30 DIAGNOSIS — D50.9 IRON DEFICIENCY ANEMIA, UNSPECIFIED: ICD-10-CM

## 2023-05-30 PROCEDURE — 99214 OFFICE O/P EST MOD 30 MIN: CPT | Performed by: INTERNAL MEDICINE

## 2023-05-30 RX ORDER — FERROUS FUMARATE AND POLYSACCHRIDE IRON VITAMIN MINERAL COMPLEX SUPPLEMENT 191.2; 135.9; 1; 210; 20; 5; 5; 7; 25; 3 MG/1; MG/1; MG/1; MG/1; MG/1; MG/1; MG/1; MG/1; MG/1; UG/1
1 CAPSULE CAPSULE ORAL ONCE A DAY
Qty: 90 CAPSULE | Refills: 3 | OUTPATIENT

## 2023-05-30 RX ORDER — FERROUS FUMARATE AND POLYSACCHRIDE IRON VITAMIN MINERAL COMPLEX SUPPLEMENT 191.2; 135.9; 1; 210; 20; 5; 5; 7; 25; 3 MG/1; MG/1; MG/1; MG/1; MG/1; MG/1; MG/1; MG/1; MG/1; UG/1
1 CAPSULE CAPSULE ORAL ONCE A DAY
OUTPATIENT

## 2023-05-30 RX ORDER — BUSPIRONE HYDROCHLORIDE 7.5 MG/1
TAKE 1 TABLET BY MOUTH THREE TIMES A DAY AS NEEDED TABLET ORAL
Qty: 90 UNSPECIFIED | Refills: 0 | Status: ACTIVE | COMMUNITY

## 2023-05-30 RX ORDER — FERROUS FUMARATE AND POLYSACCHRIDE IRON VITAMIN MINERAL COMPLEX SUPPLEMENT 191.2; 135.9; 1; 210; 20; 5; 5; 7; 25; 3 MG/1; MG/1; MG/1; MG/1; MG/1; MG/1; MG/1; MG/1; MG/1; UG/1
1 CAPSULE CAPSULE ORAL ONCE A DAY
Qty: 90 CAPSULE | Refills: 3 | Status: ACTIVE | COMMUNITY
Start: 2022-01-11

## 2023-05-30 RX ORDER — FERROUS FUMARATE AND POLYSACCHRIDE IRON VITAMIN MINERAL COMPLEX SUPPLEMENT 191.2; 135.9; 1; 210; 20; 5; 5; 7; 25; 3 MG/1; MG/1; MG/1; MG/1; MG/1; MG/1; MG/1; MG/1; MG/1; UG/1
1 CAPSULE CAPSULE ORAL ONCE A DAY
Qty: 90 CAPSULE | Refills: 3 | Status: ACTIVE | COMMUNITY

## 2023-05-30 RX ORDER — BUSPIRONE HYDROCHLORIDE 7.5 MG/1
TAKE 1 TABLET BY MOUTH THREE TIMES A DAY AS NEEDED TABLET ORAL
Qty: 90 UNSPECIFIED | Refills: 0 | Status: ON HOLD | COMMUNITY

## 2023-05-30 NOTE — HPI-HEARTBURN
She admits/denies any symptoms of heartburn at this time. Admits/Denies any recent episodes of dysphagia, globus, sour eructations, bloating/gas, indigestion, early satiety, changes in appetite, coughing, abdominal/epigastric pain, or changes in bowel habits.   (Last Visit 01.31.2023) Denies any heartburn symptoms at this time.   (Last Visit 01.11.2022) Continue to deny heartburn symptoms.  Last visit (7/13/2021)  Patient denies any symptoms of heartburn at this time.         Last visit (4/13/2021)   Denies any episodes of heartburn since her last office visit.   Last visit (10/27/2020) Admit control of symptoms with dietary modifications, avoiding tomatoes products.

## 2023-05-30 NOTE — HPI-BLOATING/GAS
Patient admits/denies her bloating/gas symptoms has/not subsided. She reports--  (Last Visit 01.31.2023) She continues to admits intermitten bloating/gas symptoms..    (Last Visit 01.11.2022) Deny episodes of bloating/gas since last visit.   Last visit (7/13/2021)  She admits improvement in episodes of bloating/gas.          Last visit (4/13/2021)  Continue to admit episodes of abdominal distension, flatulence, or belching since her last office visit.          Last visit (10/27/2020) She continue to admit bloating/gas that is associated with episodes of RUQ abdominal pain. Admit generalized abdominal bloating and increase belching. Of note, patient could not take Famotidine and Pilose due to experiencing headaches. Tums help with burping episodes.

## 2023-05-30 NOTE — HPI-ANEMIA
Patient present today for a 4 month follow-up of anemia. She admits/denies continued use of Integra Plus with/out improved symptoms. Patient admits/denies any blood or melena in stools, epigastric/abdominal pains, fatigue, cold extremities, lightheadedness, dizziness, shortness of breath or headaches.   Most recent labs were completed 05.15.2023.   (Last Visit 01.31.2023) Patient presents today for a follow up of anemia. She currently admits taking OTC iron supplements. Patient denies any blood or melena in stools, epigastric/abdominal pains, cold extremities, lightheadedness, dizziness, shortness of breath or headaches. She admits symptoms of abdominal cramping and fatigue.   Most recent labs were completed 01.26.2023.     (Last Visit 01.11.2022) Patient presents today to review labs and follow up of anemia.  She state she has been feeling well and without fatigue, coldness, lightheadedness, or dizziness.    Last visit (7/13/2021) Patient presents today for a follow up of anemia.         She admits the last dose of Injectafer  mg was  (4/2021).         Of last note SBE discussed but she prefers to defer it presently. Wants to pursue iron replacement.

## 2023-05-30 NOTE — HPI-HEARTBURN
She denies any symptoms of heartburn at this time. Denies any recent episodes of dysphagia, sour eructations, indigestion, early satiety, changes in appetite, coughing, abdominal/epigastric pain, or changes in bowel habits. Admits to  bloating/gas,  globus  (Last Visit 01.31.2023) Denies any heartburn symptoms at this time.   (Last Visit 01.11.2022) Continue to deny heartburn symptoms.  Last visit (7/13/2021)  Patient denies any symptoms of heartburn at this time.         Last visit (4/13/2021)   Denies any episodes of heartburn since her last office visit.   Last visit (10/27/2020) Admit control of symptoms with dietary modifications, avoiding tomatoes products.

## 2023-05-30 NOTE — HPI-ABNORMAL FINDINGS
Patient admits/denies having any recent imaging completed.  (Last Visit 01.31.2023) She denies any recent imagings.  (Last Visit 01.11.2022) Denies any imaging since last visit.   Last visit (7/13/2021) She reports that she had a CT Abd completed at [facility] in April 2021. Per patient results were normal.   Last visit (10/27/2020) Patient presents today to review MRI completed on 10/3/2020 and lab results.

## 2023-05-30 NOTE — HPI-ABNORMAL FINDINGS
Patient denies  having any recent imaging completed on.  (Last Visit 01.31.2023) She denies any recent imagings.  (Last Visit 01.11.2022) Denies any imaging since last visit.   Last visit (7/13/2021) She reports that she had a CT Abd completed at [facility] in April 2021. Per patient results were normal.   Last visit (10/27/2020)  Patient presents today to review MRI completed on 10/3/2020 and lab results.

## 2023-05-30 NOTE — HPI-ANEMIA
Patient present today for a 4 month follow-up of anemia. She admits intermittent use of Integra Plus without improved symptoms ( She has integra few times a week ). Patient denies any blood or mucus in stools, epigastric/abdominal pains. Admits to  fatigue, cold extremities, lightheadedness, dizziness, shortness of breath or headaches.   Most recent labs were completed 05.15.2023.      (Last Visit 01.31.2023) Patient presents today for a follow up of anemia. She currently admits taking OTC iron supplements. Patient denies any blood or melena in stools, epigastric/abdominal pains, cold extremities, lightheadedness, dizziness, shortness of breath or headaches. She admits symptoms of abdominal cramping and fatigue.   Most recent labs were completed 01.26.2023.     (Last Visit 01.11.2022) Patient presents today to review labs and follow up of anemia.  She state she has been feeling well and without fatigue, coldness, lightheadedness, or dizziness.    Last visit (7/13/2021) Patient presents today for a follow up of anemia.         She admits the last dose of Injectafer  mg was  (4/2021).         Of last note SBE discussed but she prefers to defer it presently. Wants to pursue iron replacement.

## 2023-09-25 ENCOUNTER — LAB OUTSIDE AN ENCOUNTER (OUTPATIENT)
Dept: URBAN - METROPOLITAN AREA CLINIC 35 | Facility: CLINIC | Age: 43
End: 2023-09-25

## 2023-10-26 LAB
ABSOLUTE BASOPHILS: 42
ABSOLUTE EOSINOPHILS: 108
ABSOLUTE LYMPHOCYTES: 1608
ABSOLUTE MONOCYTES: 294
ABSOLUTE NEUTROPHILS: 3948
BASOPHILS: 0.7
EOSINOPHILS: 1.8
FERRITIN, SERUM: 39
HEMATOCRIT: 46.3
HEMOGLOBIN: 15.5
IRON BIND.CAP.(TIBC): 279
IRON SATURATION: 42
IRON: 117
LYMPHOCYTES: 26.8
MCH: 30.8
MCHC: 33.5
MCV: 92
MONOCYTES: 4.9
MPV: 11.9
NEUTROPHILS: 65.8
PLATELET COUNT: 227
RDW: 15.3
RED BLOOD CELL COUNT: 5.03
WHITE BLOOD CELL COUNT: 6

## 2023-10-31 ENCOUNTER — LAB OUTSIDE AN ENCOUNTER (OUTPATIENT)
Dept: URBAN - METROPOLITAN AREA CLINIC 35 | Facility: CLINIC | Age: 43
End: 2023-10-31

## 2023-10-31 ENCOUNTER — OFFICE VISIT (OUTPATIENT)
Dept: URBAN - METROPOLITAN AREA CLINIC 35 | Facility: CLINIC | Age: 43
End: 2023-10-31
Payer: COMMERCIAL

## 2023-10-31 VITALS
BODY MASS INDEX: 24.56 KG/M2 | WEIGHT: 152.8 LBS | SYSTOLIC BLOOD PRESSURE: 120 MMHG | OXYGEN SATURATION: 100 % | HEIGHT: 66 IN | DIASTOLIC BLOOD PRESSURE: 78 MMHG | HEART RATE: 94 BPM

## 2023-10-31 DIAGNOSIS — D50.9 IRON DEFICIENCY ANEMIA, UNSPECIFIED: ICD-10-CM

## 2023-10-31 PROCEDURE — 99213 OFFICE O/P EST LOW 20 MIN: CPT | Performed by: INTERNAL MEDICINE

## 2023-10-31 RX ORDER — FERROUS FUMARATE AND POLYSACCHRIDE IRON VITAMIN MINERAL COMPLEX SUPPLEMENT 191.2; 135.9; 1; 210; 20; 5; 5; 7; 25; 3 MG/1; MG/1; MG/1; MG/1; MG/1; MG/1; MG/1; MG/1; MG/1; UG/1
1 CAPSULE CAPSULE ORAL ONCE A DAY
Status: ON HOLD | COMMUNITY

## 2023-10-31 RX ORDER — BUSPIRONE HYDROCHLORIDE 7.5 MG/1
TAKE 1 TABLET BY MOUTH THREE TIMES A DAY AS NEEDED TABLET ORAL
Qty: 90 UNSPECIFIED | Refills: 0 | Status: ON HOLD | COMMUNITY

## 2023-10-31 NOTE — HPI-ANEMIA
Patient present today for a follow-up of anemia. She reports she recently had an iron infusion on 08.11.2023 by her hematologist. Patient denies any associated symptoms of blood or melena in stools, epigastric pains, cold extremities, dizziness, shortness of breath or headaches.Patient states she has had recent episodes of fatigue, lightheadedness, and abdominal pain on occasion. Patient stated if her levels were still low, her hematogist stated she will repeat the iron infusion.    Most recent labs were completed 10.25.2023.    (Last Visit 05.30.2023) Patient present today for a 4 month follow-up of anemia. She admits intermittent use of Integra Plus without improved symptoms ( She has integra few times a week ). Patient denies any blood or mucus in stools, epigastric/abdominal pains. Admits to  fatigue, cold extremities, lightheadedness, dizziness, shortness of breath or headaches.   Most recent labs were completed 05.15.2023.      (Last Visit 01.31.2023) Patient presents today for a follow up of anemia. She currently admits taking OTC iron supplements. Patient denies any blood or melena in stools, epigastric/abdominal pains, cold extremities, lightheadedness, dizziness, shortness of breath or headaches. She admits symptoms of abdominal cramping and fatigue.   Most recent labs were completed 01.26.2023.     (Last Visit 01.11.2022) Patient presents today to review labs and follow up of anemia.  She state she has been feeling well and without fatigue, coldness, lightheadedness, or dizziness.    Last visit (7/13/2021) Patient presents today for a follow up of anemia.         She admits the last dose of Injectafer  mg was  (4/2021).         Of last note SBE discussed but she prefers to defer it presently. Wants to pursue iron replacement.

## 2024-04-15 ENCOUNTER — LAB (OUTPATIENT)
Dept: URBAN - METROPOLITAN AREA CLINIC 35 | Facility: CLINIC | Age: 44
End: 2024-04-15

## 2024-04-23 LAB
ABSOLUTE BASOPHILS: 71
ABSOLUTE EOSINOPHILS: 192
ABSOLUTE LYMPHOCYTES: 1512
ABSOLUTE MONOCYTES: 469
ABSOLUTE NEUTROPHILS: 4856
BASOPHILS: 1
EOSINOPHILS: 2.7
FERRITIN, SERUM: 10
HEMATOCRIT: 46.1
HEMOGLOBIN: 15.1
IRON BIND.CAP.(TIBC): 337
IRON SATURATION: 14
IRON: 48
LYMPHOCYTES: 21.3
MCH: 31.9
MCHC: 32.8
MCV: 97.3
MONOCYTES: 6.6
MPV: 11.6
NEUTROPHILS: 68.4
PLATELET COUNT: 246
RDW: 12.4
RED BLOOD CELL COUNT: 4.74
WHITE BLOOD CELL COUNT: 7.1

## 2024-04-30 ENCOUNTER — OV EP (OUTPATIENT)
Dept: URBAN - METROPOLITAN AREA CLINIC 35 | Facility: CLINIC | Age: 44
End: 2024-04-30

## 2024-04-30 VITALS
DIASTOLIC BLOOD PRESSURE: 78 MMHG | SYSTOLIC BLOOD PRESSURE: 120 MMHG | BODY MASS INDEX: 24.98 KG/M2 | HEIGHT: 66 IN | WEIGHT: 155.4 LBS | HEART RATE: 99 BPM | OXYGEN SATURATION: 98 %

## 2024-04-30 RX ORDER — BUSPIRONE HYDROCHLORIDE 7.5 MG/1
TAKE 1 TABLET BY MOUTH THREE TIMES A DAY AS NEEDED TABLET ORAL
Qty: 90 UNSPECIFIED | Refills: 0 | Status: ON HOLD | COMMUNITY

## 2024-04-30 RX ORDER — FERROUS FUMARATE AND POLYSACCHRIDE IRON VITAMIN MINERAL COMPLEX SUPPLEMENT 191.2; 135.9; 1; 210; 20; 5; 5; 7; 25; 3 MG/1; MG/1; MG/1; MG/1; MG/1; MG/1; MG/1; MG/1; MG/1; UG/1
1 CAPSULE CAPSULE ORAL ONCE A DAY
Status: ON HOLD | COMMUNITY

## 2024-04-30 NOTE — HPI-ANEMIA
Patient present today for a 6 month follow-up of anemia. She denies any iron infusions or supplements. Patient denies any symptoms of blood or melena in stools, epigastric/abdominal pains, cold extremities, lightheadedness, dizziness, shortness of breath or headaches. She admits recent episodes of fatigue.   Most recent labs were completed 04.22.2024 with results listed below.    (Last Visit 10.31.2023) Patient present today for a follow-up of anemia. She reports she recently had an iron infusion on 08.11.2023 by her hematologist. Patient denies any associated symptoms of blood or melena in stools, epigastric pains, cold extremities, dizziness, shortness of breath or headaches.Patient states she has had recent episodes of fatigue, lightheadedness, and abdominal pain on occasion. Patient stated if her levels were still low, her hematogist stated she will repeat the iron infusion.    Most recent labs were completed 10.25.2023.    (Last Visit 05.30.2023) Patient present today for a 4 month follow-up of anemia. She admits intermittent use of Integra Plus without improved symptoms ( She has integra few times a week ). Patient denies any blood or mucus in stools, epigastric/abdominal pains. Admits to  fatigue, cold extremities, lightheadedness, dizziness, shortness of breath or headaches.   Most recent labs were completed 05.15.2023.      (Last Visit 01.31.2023) Patient presents today for a follow up of anemia. She currently admits taking OTC iron supplements. Patient denies any blood or melena in stools, epigastric/abdominal pains, cold extremities, lightheadedness, dizziness, shortness of breath or headaches. She admits symptoms of abdominal cramping and fatigue.   Most recent labs were completed 01.26.2023.     (Last Visit 01.11.2022) Patient presents today to review labs and follow up of anemia.  She state she has been feeling well and without fatigue, coldness, lightheadedness, or dizziness.    Last visit (7/13/2021) Patient presents today for a follow up of anemia.         She admits the last dose of Injectafer  mg was  (4/2021).         Of last note SBE discussed but she prefers to defer it presently. Wants to pursue iron replacement.

## 2024-04-30 NOTE — HPI-DYSPHAGIA
Patient admits recent episodes of dysphagia. Onset started about 5 months ago. She states symptoms occur on random occasons. Patient reports her last episode of oral pharyngeal dysphagia was about a week and occured twice. She states she recent had an barium swallow test completed at [facility].

## 2024-10-13 ENCOUNTER — WEB ENCOUNTER (OUTPATIENT)
Dept: URBAN - METROPOLITAN AREA CLINIC 35 | Facility: CLINIC | Age: 44
End: 2024-10-13

## 2024-10-14 ENCOUNTER — LAB OUTSIDE AN ENCOUNTER (OUTPATIENT)
Dept: URBAN - METROPOLITAN AREA CLINIC 35 | Facility: CLINIC | Age: 44
End: 2024-10-14

## 2024-10-18 LAB
ABSOLUTE BASOPHILS: 50
ABSOLUTE EOSINOPHILS: 72
ABSOLUTE LYMPHOCYTES: 1591
ABSOLUTE MONOCYTES: 331
ABSOLUTE NEUTROPHILS: 5155
BASOPHILS: 0.7
EOSINOPHILS: 1
FERRITIN, SERUM: 6
HEMATOCRIT: 39.1
HEMOGLOBIN: 12.4
IRON BIND.CAP.(TIBC): 364
IRON SATURATION: 18
IRON: 66
LYMPHOCYTES: 22.1
MCH: 29
MCHC: 31.7
MCV: 91.6
MONOCYTES: 4.6
MPV: 11.8
NEUTROPHILS: 71.6
PLATELET COUNT: 269
RDW: 12.9
RED BLOOD CELL COUNT: 4.27
WHITE BLOOD CELL COUNT: 7.2

## 2024-10-29 ENCOUNTER — OFFICE VISIT (OUTPATIENT)
Dept: URBAN - METROPOLITAN AREA CLINIC 35 | Facility: CLINIC | Age: 44
End: 2024-10-29

## 2024-10-29 ENCOUNTER — DASHBOARD ENCOUNTERS (OUTPATIENT)
Age: 44
End: 2024-10-29

## 2024-10-29 VITALS
DIASTOLIC BLOOD PRESSURE: 74 MMHG | HEART RATE: 95 BPM | SYSTOLIC BLOOD PRESSURE: 122 MMHG | OXYGEN SATURATION: 98 % | HEIGHT: 66 IN | BODY MASS INDEX: 23.63 KG/M2 | WEIGHT: 147 LBS

## 2024-10-29 PROBLEM — 267103008: Status: ACTIVE | Noted: 2024-10-29

## 2024-10-29 RX ORDER — AZELASTINE HYDROCHLORIDE 137 UG/1
1 PUFF IN EACH NOSTRIL SPRAY, METERED NASAL TWICE A DAY
Status: ACTIVE | COMMUNITY

## 2024-10-29 RX ORDER — BUSPIRONE HYDROCHLORIDE 7.5 MG/1
TAKE 1 TABLET BY MOUTH THREE TIMES A DAY AS NEEDED TABLET ORAL
Qty: 90 UNSPECIFIED | Refills: 0 | Status: ON HOLD | COMMUNITY

## 2024-10-29 RX ORDER — METOPROLOL TARTRATE 25 MG/1
0.5 TABLET TABLET, FILM COATED ORAL AS NEEDED
Status: ACTIVE | COMMUNITY

## 2024-10-29 RX ORDER — CALCIUM CARBONATE 500 MG/1
1 TABLET TABLET ORAL ONCE A DAY
Status: ACTIVE | COMMUNITY

## 2024-10-29 RX ORDER — FERROUS FUMARATE AND POLYSACCHRIDE IRON VITAMIN MINERAL COMPLEX SUPPLEMENT 191.2; 135.9; 1; 210; 20; 5; 5; 7; 25; 3 MG/1; MG/1; MG/1; MG/1; MG/1; MG/1; MG/1; MG/1; MG/1; UG/1
1 CAPSULE CAPSULE ORAL ONCE A DAY
Status: ACTIVE | COMMUNITY

## 2024-10-29 RX ORDER — PANTOPRAZOLE SODIUM 40 MG/1
1 TABLET TABLET, DELAYED RELEASE ORAL ONCE A DAY
Qty: 90 TABLET | Refills: 1 | OUTPATIENT
Start: 2024-10-29

## 2024-10-29 NOTE — HPI-DYSPHAGIA
Patient admits recent episodes of dysphagia. She states dysphagia episodes occur on random occasons. Patient reports her last episode of oral pharyngeal dysphagia was today and occurs daily. She saw the ENT ( [facility] -  Dr Tyree Valera ) who diagnosed her with possible vocal cord dysfunction. Patient has speech therapy scheduled for October 31st and she sees her ENT today. Sometimes she feels like she needs to belch, but is unable to do so. She must lean to her left side in order to belch, which provides relief.    (Last Visit 04/30/2024) Patient admits recent episodes of dysphagia. Onset started about 5 months ago. She states symptoms occur on random occasons. Patient reports her last episode of oral pharyngeal dysphagia was about a week and occured twice. She states she recent had an barium swallow test completed at [facility].

## 2024-10-29 NOTE — HPI-ANEMIA
Patient present today for a 6-month follow-up of anemia. She takes Integra Plus for 1 week prior to her menstrual cycle and 1 week during (essentially, she takes Integra Plus for 2 weeks out of each month). She denies any recent iron infusions. Patient denies any symptoms of blood or melena in stools, cold extremities, lightheadedness, or dizziness. Patient admits intermittent shortness of breath and headaches.  Most recent labs were completed 10/17/2024.  (Last Visit 04/30/2024) Patient present today for a 6-month follow-up of anemia. She denies any iron infusions or supplements. Patient denies any symptoms of blood or melena in stools, epigastric/abdominal pains, cold extremities, lightheadedness, dizziness, shortness of breath or headaches. She admits recent episodes of fatigue.   Most recent labs were completed 04.22.2024 with results listed below.    (Last Visit 10.31.2023) Patient present today for a follow-up of anemia. She reports she recently had an iron infusion on 08.11.2023 by her hematologist. Patient denies any associated symptoms of blood or melena in stools, epigastric pains, cold extremities, dizziness, shortness of breath or headaches.Patient states she has had recent episodes of fatigue, lightheadedness, and abdominal pain on occasion. Patient stated if her levels were still low, her hematogist stated she will repeat the iron infusion.    Most recent labs were completed 10.25.2023.    (Last Visit 05.30.2023) Patient present today for a 4 month follow-up of anemia. She admits intermittent use of Integra Plus without improved symptoms (She has integra few times a week). Patient denies any blood or mucus in stools, epigastric/abdominal pains. Admits to  fatigue, cold extremities, lightheadedness, dizziness, shortness of breath or headaches.   Most recent labs were completed 05.15.2023.      (Last Visit 01.31.2023) Patient presents today for a follow up of anemia. She currently admits taking OTC iron supplements. Patient denies any blood or melena in stools, epigastric/abdominal pains, cold extremities, lightheadedness, dizziness, shortness of breath or headaches. She admits symptoms of abdominal cramping and fatigue.   Most recent labs were completed 01.26.2023.     (Last Visit 01.11.2022) Patient presents today to review labs and follow up of anemia.  She state she has been feeling well and without fatigue, coldness, lightheadedness, or dizziness.    Last visit (7/13/2021) Patient presents today for a follow up of anemia.         She admits the last dose of Injectafer  mg was  (4/2021).         Of last note SBE discussedbut she prefers to defer it presently. Wants to pursue iron replacement.

## 2024-10-29 NOTE — HPI-CHANGE IN BOWEL HABITS
Patient admits a change in her bowel habits, ongoing x "always been the case". Patient is currently having 1 bowel movement per day, without strain. Stools are normal, without the presence of blood, mucus, or melena. Patient denies pruritus ani or rectal pain. She states that she has trouble when she takes the iron, which is for 2 weeks out of each month. During these 2 weeks she will feel more constipated.

## 2024-10-29 NOTE — HPI-VOMITING
Patient admits vomiting, ongoing x 1 month. Patient has vomited daily, for the last 30 days. She also admits daily gagging which feels like her throat is "cramping up".  Talking seems to trigger the gagging which seems  trigger the vomiting. Patient denies any nausea.

## 2025-01-27 ENCOUNTER — TELEPHONE ENCOUNTER (OUTPATIENT)
Dept: URBAN - METROPOLITAN AREA CLINIC 35 | Facility: CLINIC | Age: 45
End: 2025-01-27

## 2025-02-04 ENCOUNTER — OFFICE VISIT (OUTPATIENT)
Dept: URBAN - METROPOLITAN AREA CLINIC 35 | Facility: CLINIC | Age: 45
End: 2025-02-04
Payer: COMMERCIAL

## 2025-02-04 VITALS
DIASTOLIC BLOOD PRESSURE: 72 MMHG | WEIGHT: 145 LBS | HEART RATE: 94 BPM | BODY MASS INDEX: 23.3 KG/M2 | HEIGHT: 66 IN | OXYGEN SATURATION: 99 % | SYSTOLIC BLOOD PRESSURE: 122 MMHG

## 2025-02-04 DIAGNOSIS — D50.9 IRON DEFICIENCY ANEMIA, UNSPECIFIED: ICD-10-CM

## 2025-02-04 PROCEDURE — 99213 OFFICE O/P EST LOW 20 MIN: CPT | Performed by: INTERNAL MEDICINE

## 2025-02-04 RX ORDER — CALCIUM CARBONATE 500 MG/1
1 TABLET TABLET ORAL ONCE A DAY
Status: ACTIVE | COMMUNITY

## 2025-02-04 RX ORDER — FERROUS FUMARATE AND POLYSACCHRIDE IRON VITAMIN MINERAL COMPLEX SUPPLEMENT 191.2; 135.9; 1; 210; 20; 5; 5; 7; 25; 3 MG/1; MG/1; MG/1; MG/1; MG/1; MG/1; MG/1; MG/1; MG/1; UG/1
1 CAPSULE CAPSULE ORAL ONCE A DAY
Status: ACTIVE | COMMUNITY

## 2025-02-04 RX ORDER — AZELASTINE HYDROCHLORIDE 137 UG/1
1 PUFF IN EACH NOSTRIL SPRAY, METERED NASAL TWICE A DAY
Status: ACTIVE | COMMUNITY

## 2025-02-04 RX ORDER — PANTOPRAZOLE SODIUM 40 MG/1
1 TABLET TABLET, DELAYED RELEASE ORAL ONCE A DAY
Qty: 90 TABLET | Refills: 1 | Status: ACTIVE | COMMUNITY
Start: 2024-10-29

## 2025-02-04 RX ORDER — BUSPIRONE HYDROCHLORIDE 7.5 MG/1
TAKE 1 TABLET BY MOUTH THREE TIMES A DAY AS NEEDED TABLET ORAL
Qty: 90 UNSPECIFIED | Refills: 0 | Status: ON HOLD | COMMUNITY

## 2025-02-04 RX ORDER — METOPROLOL TARTRATE 25 MG/1
0.5 TABLET TABLET, FILM COATED ORAL AS NEEDED
Status: ACTIVE | COMMUNITY

## 2025-02-04 NOTE — HPI-CHANGE IN BOWEL HABITS
Patient her today for a follow  regards to change in bowel habits.  She denies a change in her bowel habits. Patient is currently having 1 bowel movement per day, without strain. Stools are normal and smooth move. She denies presence of blood, mucus, or melena. Patient denies pruritus ani or rectal pain.  Patient denies constipation and diarrhea.    (10.29.2024) Patient admits a change in her bowel habits, ongoing x "always been the case". Patient is currently having 1 bowel movement per day, without strain. Stools are normal, without the presence of blood, mucus, or melena. Patient denies pruritus ani or rectal pain. She states that she has trouble when she takes the iron, which is for 2 weeks out of each month. During these 2 weeks she will feel more constipated.

## 2025-02-04 NOTE — HPI-ANEMIA
Patient present today for a 3-month follow-up of anemia. She states from the previous visit  she takes Integra Plus for 1 week prior to her menstrual cycle and 1 week during (essentially, she takes Integra Plus for 2 weeks out of each month). She denies any recent iron infusions. Patient denies any symptoms of blood or melena in stools, cold extremities, lightheadedness, or dizziness. Patient denies intermittent shortness of breath and headaches.  Most recent labs completed on 01.31.2025 listed below.  (Last 10/29/2024) Patient present today for a 6-month follow-up of anemia. She takes Integra Plus for 1 week prior to her menstrual cycle and 1 week during (essentially, she takes Integra Plus for 2 weeks out of each month). She denies any recent iron infusions. Patient denies any symptoms of blood or melena in stools, cold extremities, lightheadedness, or dizziness. Patient admits intermittent shortness of breath and headaches.  Most recent labs were completed 10/17/2024.  (Last Visit 04/30/2024) Patient present today for a 6-month follow-up of anemia. She denies any iron infusions or supplements. Patient denies any symptoms of blood or melena in stools, epigastric/abdominal pains, cold extremities, lightheadedness, dizziness, shortness of breath or headaches. She admits recent episodes of fatigue.   Most recent labs were completed 04.22.2024 with results listed below.    (Last Visit 10.31.2023) Patient present today for a follow-up of anemia. She reports she recently had an iron infusion on 08.11.2023 by her hematologist. Patient denies any associated symptoms of blood or melena in stools, epigastric pains, cold extremities, dizziness, shortness of breath or headaches.Patient states she has had recent episodes of fatigue, lightheadedness, and abdominal pain on occasion. Patient stated if her levels were still low, her hematogist stated she will repeat the iron infusion.    Most recent labs were completed 10.25.2023.    (Last Visit 05.30.2023) Patient present today for a 4 month follow-up of anemia. She admits intermittent use of Integra Plus without improved symptoms (She has integra few times a week). Patient denies any blood or mucus in stools, epigastric/abdominal pains. Admits to  fatigue, cold extremities, lightheadedness, dizziness, shortness of breath or headaches.   Most recent labs were completed 05.15.2023.    (Last Visit 01.31.2023) Patient presents today for a follow up of anemia. She currently admits taking OTC iron supplements. Patient denies any blood or melena in stools, epigastric/abdominal pains, cold extremities, lightheadedness, dizziness, shortness of breath or headaches. She admits symptoms of abdominal cramping and fatigue.   Most recent labs were completed 01.26.2023.   (Last Visit 01.11.2022) Patient presents today to review labs and follow up of anemia.  She state she has been feeling well and without fatigue, coldness, lightheadedness, or dizziness.  Last visit (7/13/2021) Patient presents today for a follow up of anemia.         She admits the last dose of Injectafer  mg was  (4/2021).         Of last note SBE discussedbut she prefers to defer it presently. Wants to pursue iron replacement.
none

## 2025-02-04 NOTE — HPI-DYSPHAGIA
Patient admits recent episodes of dysphagia. She states she discontinues to have dysphagia episodes that occur on random occasions. She states she completed speech therapy 01.16.2024. Patient reports her last episode of oral pharyngeal dysphagia was 3 days ago; brought on by stress.  She admits feels like she needs to belch more often when she eats too fast and large potion.  (Last Visit 10.29.2024) Patient admits recent episodes of dysphagia. She states dysphagia episodes occur on random occasions. Patient reports her last episode of oral pharyngeal dysphagia was today and occurs daily. She saw the ENT ([facility] -  Dr Tyree Valera) who diagnosed her with possible vocal cord dysfunction. Patient has speech therapy scheduled for October 31stand she sees her ENT today. Sometimes she feels like she needs to belch, but is unable to do so. She must lean to her left side in order to belch, which provides relief.   (Last Visit 04/30/2024) Patient admits recent episodes of dysphagia. Onset started about 5 months ago. She states symptoms occur on random occasions. Patient reports her last episode of oral pharyngeal dysphagia was about a week and occurred twice. She states she recently had an barium swallow test completed at [facility].

## 2025-02-04 NOTE — HPI-VOMITING
Patient denies vomiting. Patient  states her last episode of vomiting was one month ago.  She mentioned at her last visit,  that  daily she was gagging which made her feel like her throat is "cramping up". She states she does not have the symptom of gagging.  She states at her prior visit it was caused by post nasal drip.  She does not have episodes when talking seems to trigger the gagging which seems to trigger the vomiting. Patient denies any nausea.  (Last Visit 10.29.2024) Patient admits vomiting, ongoing x 1 month. Patient has vomited daily, for the last 30 days. She also admits daily gagging which feels like her throat is "cramping up".  Talking seems to trigger the gagging which seems  trigger the vomiting. Patient denies any nausea.

## 2025-02-25 ENCOUNTER — WEB ENCOUNTER (OUTPATIENT)
Dept: URBAN - METROPOLITAN AREA CLINIC 35 | Facility: CLINIC | Age: 45
End: 2025-02-25

## 2025-02-25 RX ORDER — FERROUS FUMARATE AND POLYSACCHRIDE IRON VITAMIN MINERAL COMPLEX SUPPLEMENT 191.2; 135.9; 1; 210; 20; 5; 5; 7; 25; 3 MG/1; MG/1; MG/1; MG/1; MG/1; MG/1; MG/1; MG/1; MG/1; UG/1
1 CAPSULE CAPSULE ORAL ONCE A DAY
Qty: 90 CAPSULE | Refills: 3

## 2025-05-31 ENCOUNTER — WEB ENCOUNTER (OUTPATIENT)
Dept: URBAN - METROPOLITAN AREA CLINIC 35 | Facility: CLINIC | Age: 45
End: 2025-05-31